# Patient Record
Sex: MALE | NOT HISPANIC OR LATINO | Employment: OTHER | RURAL
[De-identification: names, ages, dates, MRNs, and addresses within clinical notes are randomized per-mention and may not be internally consistent; named-entity substitution may affect disease eponyms.]

---

## 2020-11-28 ENCOUNTER — HISTORICAL (OUTPATIENT)
Dept: ADMINISTRATIVE | Facility: HOSPITAL | Age: 65
End: 2020-11-28

## 2020-11-28 LAB
ALBUMIN SERPL BCP-MCNC: 3.6 G/DL (ref 3.4–5)
ALBUMIN/GLOB SERPL: 1 {RATIO}
ALP SERPL-CCNC: 161 U/L (ref 50–136)
ALT SERPL W P-5'-P-CCNC: 28 U/L (ref 12–78)
ANION GAP SERPL CALCULATED.3IONS-SCNC: 13 MMOL/L
AST SERPL W P-5'-P-CCNC: 49 U/L (ref 15–37)
BACTERIA #/AREA URNS HPF: ABNORMAL /HPF
BASOPHILS # BLD AUTO: 0.01 X10E3/UL (ref 0–0.2)
BASOPHILS NFR BLD AUTO: 0.2 % (ref 0–1)
BILIRUB SERPL-MCNC: 0.5 MG/DL (ref 0.2–1)
BILIRUB UR QL STRIP: NEGATIVE MG/DL
BUN SERPL-MCNC: 16 MG/DL (ref 7–18)
CALCIUM SERPL-MCNC: 9.4 MG/DL (ref 8.5–10.1)
CHLORIDE SERPL-SCNC: 96 MMOL/L (ref 101–111)
CLARITY UR: ABNORMAL
CLARITY UR: ABNORMAL
CO2 SERPL-SCNC: 28 MMOL/L (ref 21–32)
COLOR UR: YELLOW
COLOR UR: YELLOW
CREAT SERPL-MCNC: 0.9 MG/DL (ref 0.6–1.3)
EOSINOPHIL # BLD AUTO: 0 X10E3/UL (ref 0–0.5)
EOSINOPHIL NFR BLD AUTO: 0 % (ref 1–4)
ERYTHROCYTE [DISTWIDTH] IN BLOOD BY AUTOMATED COUNT: 13.7 % (ref 11.5–14.5)
GLOBULIN SER-MCNC: 4.7 G/DL
GLUCOSE SERPL-MCNC: 124 MG/DL (ref 74–106)
GLUCOSE UR STRIP-MCNC: NORMAL MG/DL
HCT VFR BLD AUTO: 43.9 % (ref 40–54)
HGB BLD-MCNC: 15.2 G/DL (ref 13.5–18)
KETONES UR STRIP-SCNC: NEGATIVE MG/DL
LEUKOCYTE ESTERASE UR QL STRIP: NEGATIVE LEU/UL
LIPASE SERPL-CCNC: 99 U/L (ref 73–393)
LYMPHOCYTES # BLD AUTO: 1.41 X10E3/UL (ref 1–4.8)
LYMPHOCYTES NFR BLD AUTO: 28.1 % (ref 27–41)
MAGNESIUM SERPL-MCNC: 1.9 MG/DL (ref 1.8–2.4)
MCH RBC QN AUTO: 33.5 PG (ref 27–31)
MCHC RBC AUTO-ENTMCNC: 34.6 G/DL (ref 32–36)
MCV RBC AUTO: 97 FL (ref 80–96)
MONOCYTES # BLD AUTO: 0.71 X10E3/UL (ref 0–0.8)
MONOCYTES NFR BLD AUTO: 14.2 % (ref 2–6)
MPC BLD CALC-MCNC: 10.2 FL (ref 9.4–12.4)
NEUTROPHILS # BLD AUTO: 2.88 X10E3/UL (ref 1.8–7.7)
NEUTROPHILS NFR BLD AUTO: 57.5 % (ref 53–65)
NITRITE UR QL STRIP: POSITIVE
PH UR STRIP: 6 PH UNITS (ref 5–8)
PLATELET # BLD AUTO: 90 X10E3/UL (ref 150–400)
POTASSIUM SERPL-SCNC: 3.4 MMOL/L (ref 3.6–5)
PROT SERPL-MCNC: 8.3 G/DL (ref 6.4–8.2)
PROT UR QL STRIP: ABNORMAL MG/DL
RBC # BLD AUTO: 4.54 X10E6/UL (ref 4.6–6.2)
RBC # UR STRIP: NEGATIVE ERY/UL
RBC #/AREA URNS HPF: ABNORMAL /HPF (ref 0–3)
SODIUM SERPL-SCNC: 134 MMOL/L (ref 135–145)
SP GR UR STRIP: 1.02 (ref 1–1.03)
SQUAMOUS #/AREA URNS LPF: ABNORMAL /LPF
UROBILINOGEN UR STRIP-ACNC: 0.2 MG/DL
WBC # BLD AUTO: 5.01 X10E3/UL (ref 4.5–11)
WBC #/AREA URNS HPF: ABNORMAL /HPF (ref 0–5)

## 2020-12-01 LAB
REPORT: 38
REPORT: NORMAL

## 2021-08-04 ENCOUNTER — OFFICE VISIT (OUTPATIENT)
Dept: FAMILY MEDICINE | Facility: CLINIC | Age: 66
End: 2021-08-04
Payer: COMMERCIAL

## 2021-08-04 VITALS
RESPIRATION RATE: 16 BRPM | DIASTOLIC BLOOD PRESSURE: 63 MMHG | HEIGHT: 65 IN | TEMPERATURE: 98 F | HEART RATE: 103 BPM | SYSTOLIC BLOOD PRESSURE: 121 MMHG | OXYGEN SATURATION: 95 %

## 2021-08-04 DIAGNOSIS — R26.0 ATAXIC GAIT: ICD-10-CM

## 2021-08-04 DIAGNOSIS — I73.9 PVD (PERIPHERAL VASCULAR DISEASE): ICD-10-CM

## 2021-08-04 DIAGNOSIS — R53.81 DEBILITY: ICD-10-CM

## 2021-08-04 DIAGNOSIS — S88.919A AMPUTATION OF LEG: Primary | ICD-10-CM

## 2021-08-04 PROCEDURE — 99213 PR OFFICE/OUTPT VISIT, EST, LEVL III, 20-29 MIN: ICD-10-PCS | Mod: ,,, | Performed by: FAMILY MEDICINE

## 2021-08-04 PROCEDURE — 99213 OFFICE O/P EST LOW 20 MIN: CPT | Mod: ,,, | Performed by: FAMILY MEDICINE

## 2021-08-04 RX ORDER — CLOPIDOGREL BISULFATE 75 MG/1
75 TABLET ORAL DAILY
COMMUNITY
Start: 2021-03-28 | End: 2022-01-26 | Stop reason: SDUPTHER

## 2021-08-04 RX ORDER — LISINOPRIL 2.5 MG/1
2.5 TABLET ORAL DAILY
COMMUNITY
Start: 2021-03-28 | End: 2022-01-26 | Stop reason: SDUPTHER

## 2021-08-04 RX ORDER — PANTOPRAZOLE SODIUM 40 MG/1
40 TABLET, DELAYED RELEASE ORAL DAILY
COMMUNITY
Start: 2021-03-28 | End: 2022-01-26 | Stop reason: SDUPTHER

## 2021-08-04 RX ORDER — ATORVASTATIN CALCIUM 80 MG/1
80 TABLET, FILM COATED ORAL NIGHTLY
COMMUNITY
Start: 2021-03-28 | End: 2022-01-26 | Stop reason: SDUPTHER

## 2021-12-30 ENCOUNTER — OFFICE VISIT (OUTPATIENT)
Dept: FAMILY MEDICINE | Facility: CLINIC | Age: 66
End: 2021-12-30
Payer: COMMERCIAL

## 2021-12-30 VITALS
HEART RATE: 99 BPM | SYSTOLIC BLOOD PRESSURE: 124 MMHG | TEMPERATURE: 100 F | DIASTOLIC BLOOD PRESSURE: 70 MMHG | OXYGEN SATURATION: 93 % | RESPIRATION RATE: 18 BRPM

## 2021-12-30 DIAGNOSIS — J06.9 UPPER RESPIRATORY TRACT INFECTION, UNSPECIFIED TYPE: Primary | ICD-10-CM

## 2021-12-30 DIAGNOSIS — R05.9 COUGH: ICD-10-CM

## 2021-12-30 DIAGNOSIS — Z20.828 EXPOSURE TO SARS VIRUS: ICD-10-CM

## 2021-12-30 DIAGNOSIS — R09.89 CHEST CONGESTION: ICD-10-CM

## 2021-12-30 LAB
CTP QC/QA: YES
FLUAV AG NPH QL: NEGATIVE
FLUBV AG NPH QL: NEGATIVE
SARS-COV-2 AG RESP QL IA.RAPID: NEGATIVE

## 2021-12-30 PROCEDURE — 3078F PR MOST RECENT DIASTOLIC BLOOD PRESSURE < 80 MM HG: ICD-10-PCS | Mod: ,,, | Performed by: NURSE PRACTITIONER

## 2021-12-30 PROCEDURE — 3074F PR MOST RECENT SYSTOLIC BLOOD PRESSURE < 130 MM HG: ICD-10-PCS | Mod: ,,, | Performed by: NURSE PRACTITIONER

## 2021-12-30 PROCEDURE — 99213 OFFICE O/P EST LOW 20 MIN: CPT | Mod: ,,, | Performed by: NURSE PRACTITIONER

## 2021-12-30 PROCEDURE — 1159F PR MEDICATION LIST DOCUMENTED IN MEDICAL RECORD: ICD-10-PCS | Mod: ,,, | Performed by: NURSE PRACTITIONER

## 2021-12-30 PROCEDURE — 87428 POCT SARS-COV2 (COVID) WITH FLU ANTIGEN: ICD-10-PCS | Mod: QW,,, | Performed by: NURSE PRACTITIONER

## 2021-12-30 PROCEDURE — 1160F RVW MEDS BY RX/DR IN RCRD: CPT | Mod: ,,, | Performed by: NURSE PRACTITIONER

## 2021-12-30 PROCEDURE — 3078F DIAST BP <80 MM HG: CPT | Mod: ,,, | Performed by: NURSE PRACTITIONER

## 2021-12-30 PROCEDURE — 1159F MED LIST DOCD IN RCRD: CPT | Mod: ,,, | Performed by: NURSE PRACTITIONER

## 2021-12-30 PROCEDURE — 3074F SYST BP LT 130 MM HG: CPT | Mod: ,,, | Performed by: NURSE PRACTITIONER

## 2021-12-30 PROCEDURE — 1160F PR REVIEW ALL MEDS BY PRESCRIBER/CLIN PHARMACIST DOCUMENTED: ICD-10-PCS | Mod: ,,, | Performed by: NURSE PRACTITIONER

## 2021-12-30 PROCEDURE — 99213 PR OFFICE/OUTPT VISIT, EST, LEVL III, 20-29 MIN: ICD-10-PCS | Mod: ,,, | Performed by: NURSE PRACTITIONER

## 2021-12-30 PROCEDURE — 87428 SARSCOV & INF VIR A&B AG IA: CPT | Mod: QW,,, | Performed by: NURSE PRACTITIONER

## 2021-12-30 RX ORDER — PREDNISONE 50 MG/1
50 TABLET ORAL DAILY
Qty: 5 TABLET | Refills: 0 | Status: ON HOLD | OUTPATIENT
Start: 2021-12-30 | End: 2022-01-11 | Stop reason: ALTCHOICE

## 2021-12-30 RX ORDER — DOXYCYCLINE 100 MG/1
100 CAPSULE ORAL EVERY 12 HOURS
Qty: 20 CAPSULE | Refills: 0 | Status: SHIPPED | OUTPATIENT
Start: 2021-12-30 | End: 2022-01-04

## 2021-12-30 RX ORDER — PROMETHAZINE HYDROCHLORIDE AND DEXTROMETHORPHAN HYDROBROMIDE 6.25; 15 MG/5ML; MG/5ML
5 SYRUP ORAL EVERY 6 HOURS PRN
Qty: 118 ML | Refills: 0 | Status: SHIPPED | OUTPATIENT
Start: 2021-12-30 | End: 2022-01-09

## 2022-01-04 ENCOUNTER — OFFICE VISIT (OUTPATIENT)
Dept: FAMILY MEDICINE | Facility: CLINIC | Age: 67
End: 2022-01-04
Payer: COMMERCIAL

## 2022-01-04 VITALS
DIASTOLIC BLOOD PRESSURE: 80 MMHG | SYSTOLIC BLOOD PRESSURE: 120 MMHG | RESPIRATION RATE: 18 BRPM | HEART RATE: 110 BPM | OXYGEN SATURATION: 98 %

## 2022-01-04 DIAGNOSIS — J11.1 INFLUENZA: Primary | ICD-10-CM

## 2022-01-04 DIAGNOSIS — R05.9 COUGH: ICD-10-CM

## 2022-01-04 LAB
CTP QC/QA: YES
FLUAV AG NPH QL: POSITIVE
FLUBV AG NPH QL: NEGATIVE
SARS-COV-2 AG RESP QL IA.RAPID: NEGATIVE

## 2022-01-04 PROCEDURE — 3079F PR MOST RECENT DIASTOLIC BLOOD PRESSURE 80-89 MM HG: ICD-10-PCS | Mod: ,,, | Performed by: NURSE PRACTITIONER

## 2022-01-04 PROCEDURE — 87428 POCT SARS-COV2 (COVID) WITH FLU ANTIGEN: ICD-10-PCS | Mod: QW,,, | Performed by: NURSE PRACTITIONER

## 2022-01-04 PROCEDURE — 99213 OFFICE O/P EST LOW 20 MIN: CPT | Mod: ,,, | Performed by: NURSE PRACTITIONER

## 2022-01-04 PROCEDURE — 3074F SYST BP LT 130 MM HG: CPT | Mod: ,,, | Performed by: NURSE PRACTITIONER

## 2022-01-04 PROCEDURE — 87428 SARSCOV & INF VIR A&B AG IA: CPT | Mod: QW,,, | Performed by: NURSE PRACTITIONER

## 2022-01-04 PROCEDURE — 99213 PR OFFICE/OUTPT VISIT, EST, LEVL III, 20-29 MIN: ICD-10-PCS | Mod: ,,, | Performed by: NURSE PRACTITIONER

## 2022-01-04 PROCEDURE — 3074F PR MOST RECENT SYSTOLIC BLOOD PRESSURE < 130 MM HG: ICD-10-PCS | Mod: ,,, | Performed by: NURSE PRACTITIONER

## 2022-01-04 PROCEDURE — 1159F PR MEDICATION LIST DOCUMENTED IN MEDICAL RECORD: ICD-10-PCS | Mod: ,,, | Performed by: NURSE PRACTITIONER

## 2022-01-04 PROCEDURE — 1159F MED LIST DOCD IN RCRD: CPT | Mod: ,,, | Performed by: NURSE PRACTITIONER

## 2022-01-04 PROCEDURE — 3079F DIAST BP 80-89 MM HG: CPT | Mod: ,,, | Performed by: NURSE PRACTITIONER

## 2022-01-04 RX ORDER — OSELTAMIVIR PHOSPHATE 75 MG/1
75 CAPSULE ORAL 2 TIMES DAILY
Qty: 10 CAPSULE | Refills: 0 | Status: SHIPPED | OUTPATIENT
Start: 2022-01-04 | End: 2022-01-09

## 2022-01-04 NOTE — PROGRESS NOTES
STANFORD Ness   [facility]  [address] [address] [address]  [phone]      PATIENT NAME: Luis Manuel Mustafa  : 1953  DATE: 21  MRN: [id number]      Billing Provider: STANFORD Ness  Level of Service:   Patient PCP Information     Provider PCP Type    Farhana Olivier MD General          Reason for Visit / Chief Complaint: Cough and Diarrhea (C/o stomach hurting)       Update PCP  Update Chief Complaint         History of Present Illness / Problem Focused Workflow       Presents with complaints of cough, chest congestion, abdominal pain, diarrhea for several days. He has concerns of covid      Review of Systems     Review of Systems   Constitutional: Positive for fatigue. Negative for chills and fever.   HENT: Positive for congestion. Negative for ear pain and sore throat.    Eyes: Negative for discharge.   Respiratory: Positive for cough. Negative for shortness of breath.         Chest congestion   Cardiovascular: Negative for palpitations.   Gastrointestinal: Positive for abdominal pain, diarrhea and nausea. Negative for vomiting.   Genitourinary: Negative for dysuria.   Musculoskeletal: Negative for gait problem.   Allergic/Immunologic: Negative for environmental allergies.   Neurological: Negative for dizziness, weakness, light-headedness and headaches.   Psychiatric/Behavioral: Negative for dysphoric mood. The patient is not nervous/anxious.        Medical / Social / Family History     Past Medical History:   Diagnosis Date    Below knee amputation     Hypertension     Pacemaker        History reviewed. No pertinent surgical history.    Social History    reports that he has been smoking. His smokeless tobacco use includes snuff. He reports current alcohol use. He reports that he does not use drugs.    Family History  's family history is not on file.    Medications and Allergies     Medications  Outpatient Medications Marked as  Taking for the 12/30/21 encounter (Office Visit) with STANFORD Ness   Medication Sig Dispense Refill    atorvastatin (LIPITOR) 80 MG tablet Take 80 mg by mouth nightly.      clopidogreL (PLAVIX) 75 mg tablet Take 75 mg by mouth once daily.      lisinopriL (PRINIVIL,ZESTRIL) 2.5 MG tablet Take 2.5 mg by mouth once daily.      pantoprazole (PROTONIX) 40 MG tablet Take 40 mg by mouth once daily.         Allergies  Review of patient's allergies indicates:  No Known Allergies    Physical Examination     Vitals:    12/30/21 1014   BP: 124/70   Pulse: 99   Resp: 18   Temp: 99.8 °F (37.7 °C)     Physical Exam  Constitutional:       General: He is not in acute distress.  HENT:      Head: Normocephalic.      Nose: Congestion present. No rhinorrhea.      Mouth/Throat:      Mouth: Mucous membranes are moist.      Pharynx: No posterior oropharyngeal erythema.   Eyes:      Extraocular Movements: Extraocular movements intact.   Cardiovascular:      Rate and Rhythm: Normal rate.      Heart sounds: Normal heart sounds.   Pulmonary:      Effort: Pulmonary effort is normal. No respiratory distress.      Breath sounds: No wheezing.   Abdominal:      General: Bowel sounds are normal.   Musculoskeletal:         General: Normal range of motion.      Cervical back: Neck supple.   Skin:     General: Skin is warm.   Neurological:      Mental Status: He is alert and oriented to person, place, and time.   Psychiatric:         Mood and Affect: Mood normal.           Imaging / Labs       Office Visit on 12/30/2021   Component Date Value Ref Range Status    SARS Coronavirus 2 Antigen 12/30/2021 Negative  Negative Final    Rapid Influenza A Ag 12/30/2021 Negative  Negative Final    Rapid Influenza B Ag 12/30/2021 Negative  Negative Final     Acceptable 12/30/2021 Yes   Final       Assessment and Plan (including Health Maintenance)      Problem List  Smart Sets  Document Outside HM   :    Health Maintenance Due   Topic  Date Due    Hepatitis C Screening  Never done    Lipid Panel  Never done    COVID-19 Vaccine (1) Never done    Pneumococcal Vaccines (Age 65+) (1 of 2 - PPSV23) Never done    TETANUS VACCINE  Never done    Colorectal Cancer Screening  Never done    Shingles Vaccine (1 of 2) Never done    Abdominal Aortic Aneurysm Screening  Never done    Influenza Vaccine (1) Never done       Problem List Items Addressed This Visit        ENT    Upper respiratory tract infection - Primary    Relevant Medications    doxycycline (VIBRAMYCIN) 100 MG Cap    predniSONE (DELTASONE) 50 MG Tab    promethazine-dextromethorphan (PROMETHAZINE-DM) 6.25-15 mg/5 mL Syrp      Other Visit Diagnoses     Exposure to SARS virus        Relevant Orders    POCT SARS-COV2 (COVID) with Flu Antigen (Completed)    Cough        Relevant Medications    predniSONE (DELTASONE) 50 MG Tab    promethazine-dextromethorphan (PROMETHAZINE-DM) 6.25-15 mg/5 mL Syrp    Chest congestion        Relevant Medications    predniSONE (DELTASONE) 50 MG Tab          The patient has no Health Maintenance topics of status Not Due    No future appointments.       Signature:  STANFORD Ness  [address] [address]  [phone]    Date of encounter: 12/30/21

## 2022-01-04 NOTE — PROGRESS NOTES
"   STANFORD Oakley   [facility]  [address]  [place] [address]     PATIENT NAME: Luis Manuel Mustafa  : 1953  DATE: 22  MRN: [id number]      Billing Provider: STANFORD Oakley  Level of Service: MA OFFICE/OUTPT VISIT, EST, LEVL III, 20-29 MIN  Patient PCP Information     Provider PCP Type    Farhana Olivier MD General          Reason for Visit / Chief Complaint: Cough and Nasal Congestion ("just not feeling good")       Update PCP  Update Chief Complaint         History of Present Illness / Problem Focused Workflow     Luis Manuel Mustafa presents to the clinic c/o congestion and cough for several days. No known fever. Has been on doxycycline and prednisone.  Denies SOB      Review of Systems     Review of Systems   Constitutional: Positive for fatigue. Negative for activity change, appetite change, chills and fever.   HENT: Positive for nasal congestion and rhinorrhea. Negative for sore throat and trouble swallowing.    Respiratory: Positive for cough. Negative for shortness of breath.    Cardiovascular: Negative for chest pain.   Gastrointestinal: Negative for abdominal pain, blood in stool, change in bowel habit, nausea, vomiting and change in bowel habit.   Neurological: Negative for dizziness, weakness and headaches.        Medical / Social / Family History     Past Medical History:   Diagnosis Date    Below knee amputation     Hypertension     Pacemaker        No past surgical history on file.    Social History    reports that he has been smoking. His smokeless tobacco use includes snuff. He reports current alcohol use. He reports that he does not use drugs.    Family History  's family history is not on file.    Medications and Allergies     Medications  Outpatient Medications Marked as Taking for the 22 encounter (Office Visit) with STANFORD Gallardo   Medication Sig Dispense Refill    atorvastatin (LIPITOR) 80 MG tablet Take 80 mg by mouth nightly.      " clopidogreL (PLAVIX) 75 mg tablet Take 75 mg by mouth once daily.      lisinopriL (PRINIVIL,ZESTRIL) 2.5 MG tablet Take 2.5 mg by mouth once daily.      pantoprazole (PROTONIX) 40 MG tablet Take 40 mg by mouth once daily.      predniSONE (DELTASONE) 50 MG Tab Take 1 tablet (50 mg total) by mouth once daily. 5 tablet 0       Allergies  Review of patient's allergies indicates:  No Known Allergies    Physical Examination     Vitals:    01/04/22 1500   BP: 120/80   Pulse: 110   Resp: 18   SpO2: 98%      Physical Exam  Constitutional:       Appearance: Normal appearance.   HENT:      Head: Normocephalic.      Right Ear: Hearing and ear canal normal. No tenderness. Tympanic membrane is not injected.      Left Ear: Hearing and ear canal normal. No tenderness. Tympanic membrane is not injected.      Nose: Congestion and rhinorrhea present. No nasal deformity. Rhinorrhea is clear.      Right Turbinates: Not swollen.      Left Turbinates: Not swollen.      Mouth/Throat:      Lips: Pink.      Mouth: Mucous membranes are moist.      Pharynx: No oropharyngeal exudate or posterior oropharyngeal erythema.      Tonsils: No tonsillar exudate.   Eyes:      General: Lids are normal. No allergic shiner.        Right eye: No discharge.         Left eye: No discharge.      Conjunctiva/sclera:      Right eye: Right conjunctiva is not injected.      Left eye: Left conjunctiva is not injected.   Neck:      Trachea: Trachea normal.   Cardiovascular:      Rate and Rhythm: Normal rate and regular rhythm.      Pulses: Normal pulses.      Heart sounds: Normal heart sounds.   Pulmonary:      Effort: Pulmonary effort is normal. No tachypnea, accessory muscle usage or respiratory distress.      Breath sounds: Normal breath sounds.   Abdominal:      Palpations: Abdomen is soft.   Musculoskeletal:         General: Normal range of motion.      Cervical back: Neck supple.   Lymphadenopathy:      Cervical: Cervical adenopathy present.   Skin:      General: Skin is warm and dry.   Neurological:      Mental Status: He is alert.          Assessment and Plan (including Health Maintenance)      Problem List  Smart Sets  Document Outside HM   :      Health Maintenance Due   Topic Date Due    Hepatitis C Screening  Never done    Lipid Panel  Never done    COVID-19 Vaccine (1) Never done    Pneumococcal Vaccines (Age 65+) (1 of 2 - PPSV23) Never done    TETANUS VACCINE  Never done    Colorectal Cancer Screening  Never done    Shingles Vaccine (1 of 2) Never done    Abdominal Aortic Aneurysm Screening  Never done    Influenza Vaccine (1) Never done       Problem List Items Addressed This Visit    None     Visit Diagnoses     Influenza    -  Primary    Will treat influenza with tamiflu and pt to stop doxycycline. Increase fluid intake  Can continue cough medication as directed.    Cough        Relevant Orders    POCT SARS-COV2 (COVID) with Flu Antigen (Completed)        Influenza  Comments:  Will treat influenza with tamiflu and pt to stop doxycycline. Increase fluid intake  Can continue cough medication as directed.    Cough  -     POCT SARS-COV2 (COVID) with Flu Antigen    Other orders  -     oseltamivir (TAMIFLU) 75 MG capsule; Take 1 capsule (75 mg total) by mouth 2 (two) times daily. for 5 days  Dispense: 10 capsule; Refill: 0       The patient has no Health Maintenance topics of status Not Due        Follow up in about 1 week (around 1/11/2022), or if symptoms worsen or fail to improve.     Signature:  STANFORD Oakley    Date of encounter: 1/4/22

## 2022-01-11 ENCOUNTER — HOSPITAL ENCOUNTER (INPATIENT)
Facility: HOSPITAL | Age: 67
LOS: 6 days | Discharge: HOME | DRG: 195 | End: 2022-01-17
Attending: PEDIATRICS | Admitting: PEDIATRICS
Payer: COMMERCIAL

## 2022-01-11 DIAGNOSIS — J18.9 PNEUMONIA OF RIGHT LOWER LOBE DUE TO INFECTIOUS ORGANISM: Primary | ICD-10-CM

## 2022-01-11 DIAGNOSIS — R11.0 NAUSEA: ICD-10-CM

## 2022-01-11 DIAGNOSIS — K59.00 CONSTIPATION: ICD-10-CM

## 2022-01-11 DIAGNOSIS — E87.6 HYPOKALEMIA: ICD-10-CM

## 2022-01-11 DIAGNOSIS — E86.0 DEHYDRATION: ICD-10-CM

## 2022-01-11 DIAGNOSIS — R09.02 HYPOXEMIA: ICD-10-CM

## 2022-01-11 LAB
ALBUMIN SERPL BCP-MCNC: 2 G/DL (ref 3.5–5)
ALBUMIN/GLOB SERPL: 0.4 {RATIO}
ALP SERPL-CCNC: 121 U/L (ref 45–115)
ALT SERPL W P-5'-P-CCNC: 8 U/L (ref 16–61)
ANION GAP SERPL CALCULATED.3IONS-SCNC: 8 MMOL/L (ref 7–16)
AST SERPL W P-5'-P-CCNC: 22 U/L (ref 15–37)
BACTERIA #/AREA URNS HPF: ABNORMAL /HPF
BASOPHILS # BLD AUTO: 0.01 K/UL (ref 0–0.2)
BASOPHILS NFR BLD AUTO: 0.1 % (ref 0–1)
BILIRUB SERPL-MCNC: 0.7 MG/DL (ref 0–1.2)
BILIRUB UR QL STRIP: ABNORMAL
BUN SERPL-MCNC: 7 MG/DL (ref 7–18)
BUN/CREAT SERPL: 8 (ref 6–20)
CALCIUM SERPL-MCNC: 8.7 MG/DL (ref 8.5–10.1)
CHLORIDE SERPL-SCNC: 93 MMOL/L (ref 98–107)
CLARITY UR: ABNORMAL
CO2 SERPL-SCNC: 39 MMOL/L (ref 21–32)
COLOR UR: YELLOW
CREAT SERPL-MCNC: 0.89 MG/DL (ref 0.7–1.3)
DIFFERENTIAL METHOD BLD: ABNORMAL
EOSINOPHIL # BLD AUTO: 0 K/UL (ref 0–0.5)
EOSINOPHIL NFR BLD AUTO: 0 % (ref 1–4)
ERYTHROCYTE [DISTWIDTH] IN BLOOD BY AUTOMATED COUNT: 12.9 % (ref 11.5–14.5)
FLUAV AG UPPER RESP QL IA.RAPID: NEGATIVE
FLUBV AG UPPER RESP QL IA.RAPID: NEGATIVE
GLOBULIN SER-MCNC: 5.2 G/DL (ref 2–4)
GLUCOSE SERPL-MCNC: 104 MG/DL (ref 74–106)
GLUCOSE UR STRIP-MCNC: NEGATIVE MG/DL
HCT VFR BLD AUTO: 33.7 % (ref 40–54)
HGB BLD-MCNC: 11.7 G/DL (ref 13.5–18)
KETONES UR STRIP-SCNC: 15 MG/DL
LEUKOCYTE ESTERASE UR QL STRIP: NEGATIVE
LYMPHOCYTES # BLD AUTO: 0.76 K/UL (ref 1–4.8)
LYMPHOCYTES NFR BLD AUTO: 5.9 % (ref 27–41)
LYMPHOCYTES NFR BLD MANUAL: 11 % (ref 27–41)
MCH RBC QN AUTO: 35.3 PG (ref 27–31)
MCHC RBC AUTO-ENTMCNC: 34.7 G/DL (ref 32–36)
MCV RBC AUTO: 101.8 FL (ref 80–96)
MONOCYTES # BLD AUTO: 0.99 K/UL (ref 0–0.8)
MONOCYTES NFR BLD AUTO: 7.7 % (ref 2–6)
MONOCYTES NFR BLD MANUAL: 3 % (ref 2–6)
MPC BLD CALC-MCNC: 9.6 FL (ref 9.4–12.4)
MUCOUS THREADS #/AREA URNS HPF: ABNORMAL /HPF
NEUTROPHILS # BLD AUTO: 11.14 K/UL (ref 1.8–7.7)
NEUTROPHILS NFR BLD AUTO: 86.3 % (ref 53–65)
NEUTS SEG NFR BLD MANUAL: 86 % (ref 50–62)
NITRITE UR QL STRIP: NEGATIVE
NRBC BLD MANUAL-RTO: ABNORMAL %
PH UR STRIP: 6 PH UNITS
PLATELET # BLD AUTO: 168 K/UL (ref 150–400)
POTASSIUM SERPL-SCNC: 2.3 MMOL/L (ref 3.5–5.1)
PROT SERPL-MCNC: 7.2 G/DL (ref 6.4–8.2)
PROT UR QL STRIP: 30
RBC # BLD AUTO: 3.31 M/UL (ref 4.6–6.2)
RBC # UR STRIP: NEGATIVE /UL
RBC #/AREA URNS HPF: ABNORMAL /HPF
RENAL EPI CELLS #/AREA URNS LPF: ABNORMAL /LPF
SARS-COV+SARS-COV-2 AG RESP QL IA.RAPID: NEGATIVE
SODIUM SERPL-SCNC: 138 MMOL/L (ref 136–145)
SP GR UR STRIP: 1.02
SQUAMOUS #/AREA URNS LPF: ABNORMAL /LPF
UROBILINOGEN UR STRIP-ACNC: 2 MG/DL
WBC # BLD AUTO: 12.9 K/UL (ref 4.5–11)
WBC #/AREA URNS HPF: ABNORMAL /HPF

## 2022-01-11 PROCEDURE — 96365 THER/PROPH/DIAG IV INF INIT: CPT

## 2022-01-11 PROCEDURE — 85025 COMPLETE CBC W/AUTO DIFF WBC: CPT | Performed by: EMERGENCY MEDICINE

## 2022-01-11 PROCEDURE — 99284 EMERGENCY DEPT VISIT MOD MDM: CPT | Performed by: PEDIATRICS

## 2022-01-11 PROCEDURE — 81001 URINALYSIS AUTO W/SCOPE: CPT | Performed by: PEDIATRICS

## 2022-01-11 PROCEDURE — 93010 ELECTROCARDIOGRAM REPORT: CPT | Mod: ,,, | Performed by: INTERNAL MEDICINE

## 2022-01-11 PROCEDURE — 36415 COLL VENOUS BLD VENIPUNCTURE: CPT | Performed by: EMERGENCY MEDICINE

## 2022-01-11 PROCEDURE — 11000001 HC ACUTE MED/SURG PRIVATE ROOM

## 2022-01-11 PROCEDURE — 87428 SARSCOV & INF VIR A&B AG IA: CPT | Performed by: PEDIATRICS

## 2022-01-11 PROCEDURE — 80053 COMPREHEN METABOLIC PANEL: CPT | Performed by: EMERGENCY MEDICINE

## 2022-01-11 PROCEDURE — 99285 EMERGENCY DEPT VISIT HI MDM: CPT | Mod: 25

## 2022-01-11 PROCEDURE — 87040 BLOOD CULTURE FOR BACTERIA: CPT | Performed by: EMERGENCY MEDICINE

## 2022-01-11 PROCEDURE — 63600175 PHARM REV CODE 636 W HCPCS: Performed by: PEDIATRICS

## 2022-01-11 PROCEDURE — 25000003 PHARM REV CODE 250: Performed by: PEDIATRICS

## 2022-01-11 PROCEDURE — 93005 ELECTROCARDIOGRAM TRACING: CPT

## 2022-01-11 PROCEDURE — 93010 EKG 12-LEAD: ICD-10-PCS | Mod: ,,, | Performed by: INTERNAL MEDICINE

## 2022-01-11 RX ORDER — SODIUM CHLORIDE 0.9 % (FLUSH) 0.9 %
10 SYRINGE (ML) INJECTION EVERY 12 HOURS PRN
Status: DISCONTINUED | OUTPATIENT
Start: 2022-01-12 | End: 2022-01-12

## 2022-01-11 RX ORDER — IBUPROFEN 400 MG/1
400 TABLET ORAL EVERY 6 HOURS PRN
Status: DISCONTINUED | OUTPATIENT
Start: 2022-01-12 | End: 2022-01-17 | Stop reason: HOSPADM

## 2022-01-11 RX ORDER — POTASSIUM CHLORIDE 20 MEQ/1
40 TABLET, EXTENDED RELEASE ORAL 3 TIMES DAILY
Status: DISCONTINUED | OUTPATIENT
Start: 2022-01-12 | End: 2022-01-13

## 2022-01-11 RX ORDER — PANTOPRAZOLE SODIUM 40 MG/1
40 TABLET, DELAYED RELEASE ORAL DAILY
Status: DISCONTINUED | OUTPATIENT
Start: 2022-01-12 | End: 2022-01-17 | Stop reason: HOSPADM

## 2022-01-11 RX ORDER — ENOXAPARIN SODIUM 100 MG/ML
40 INJECTION SUBCUTANEOUS EVERY 24 HOURS
Status: DISCONTINUED | OUTPATIENT
Start: 2022-01-12 | End: 2022-01-17 | Stop reason: HOSPADM

## 2022-01-11 RX ORDER — ONDANSETRON 2 MG/ML
4 INJECTION INTRAMUSCULAR; INTRAVENOUS EVERY 8 HOURS PRN
Status: DISCONTINUED | OUTPATIENT
Start: 2022-01-12 | End: 2022-01-17 | Stop reason: HOSPADM

## 2022-01-11 RX ORDER — LISINOPRIL 2.5 MG/1
2.5 TABLET ORAL DAILY
Status: DISCONTINUED | OUTPATIENT
Start: 2022-01-12 | End: 2022-01-17 | Stop reason: HOSPADM

## 2022-01-11 RX ORDER — SODIUM CHLORIDE AND POTASSIUM CHLORIDE 150; 900 MG/100ML; MG/100ML
INJECTION, SOLUTION INTRAVENOUS
Status: COMPLETED | OUTPATIENT
Start: 2022-01-11 | End: 2022-01-11

## 2022-01-11 RX ORDER — SODIUM CHLORIDE 9 MG/ML
INJECTION, SOLUTION INTRAVENOUS
Status: DISCONTINUED | OUTPATIENT
Start: 2022-01-11 | End: 2022-01-11

## 2022-01-11 RX ORDER — ACETAMINOPHEN 325 MG/1
650 TABLET ORAL EVERY 6 HOURS PRN
Status: DISCONTINUED | OUTPATIENT
Start: 2022-01-12 | End: 2022-01-17 | Stop reason: HOSPADM

## 2022-01-11 RX ORDER — POTASSIUM CHLORIDE 20 MEQ/1
40 TABLET, EXTENDED RELEASE ORAL
Status: COMPLETED | OUTPATIENT
Start: 2022-01-11 | End: 2022-01-11

## 2022-01-11 RX ORDER — ATORVASTATIN CALCIUM 40 MG/1
80 TABLET, FILM COATED ORAL NIGHTLY
Status: DISCONTINUED | OUTPATIENT
Start: 2022-01-11 | End: 2022-01-17 | Stop reason: HOSPADM

## 2022-01-11 RX ORDER — SODIUM CHLORIDE AND POTASSIUM CHLORIDE 150; 900 MG/100ML; MG/100ML
INJECTION, SOLUTION INTRAVENOUS CONTINUOUS
Status: DISCONTINUED | OUTPATIENT
Start: 2022-01-12 | End: 2022-01-12

## 2022-01-11 RX ORDER — CLOPIDOGREL BISULFATE 75 MG/1
75 TABLET ORAL DAILY
Status: DISCONTINUED | OUTPATIENT
Start: 2022-01-12 | End: 2022-01-17 | Stop reason: HOSPADM

## 2022-01-11 RX ADMIN — CEFTRIAXONE SODIUM 2 G: 2 INJECTION, POWDER, FOR SOLUTION INTRAMUSCULAR; INTRAVENOUS at 06:01

## 2022-01-11 RX ADMIN — POTASSIUM CHLORIDE 40 MEQ: 1500 TABLET, EXTENDED RELEASE ORAL at 08:01

## 2022-01-11 RX ADMIN — POTASSIUM CHLORIDE AND SODIUM CHLORIDE: 900; 150 INJECTION, SOLUTION INTRAVENOUS at 06:01

## 2022-01-12 PROBLEM — R63.4 WEIGHT LOSS, UNINTENTIONAL: Status: ACTIVE | Noted: 2022-01-12

## 2022-01-12 LAB
ALBUMIN SERPL BCP-MCNC: 1.8 G/DL (ref 3.5–5)
ALBUMIN/GLOB SERPL: 0.4 {RATIO}
ALP SERPL-CCNC: 120 U/L (ref 45–115)
ALT SERPL W P-5'-P-CCNC: 7 U/L (ref 16–61)
AMYLASE SERPL-CCNC: 37 U/L (ref 25–115)
ANION GAP SERPL CALCULATED.3IONS-SCNC: 6 MMOL/L (ref 7–16)
ANION GAP SERPL CALCULATED.3IONS-SCNC: 6 MMOL/L (ref 7–16)
AST SERPL W P-5'-P-CCNC: 19 U/L (ref 15–37)
BASOPHILS # BLD AUTO: 0.01 K/UL (ref 0–0.2)
BASOPHILS NFR BLD AUTO: 0.1 % (ref 0–1)
BILIRUB SERPL-MCNC: 0.7 MG/DL (ref 0–1.2)
BUN SERPL-MCNC: 7 MG/DL (ref 7–18)
BUN SERPL-MCNC: 7 MG/DL (ref 7–18)
BUN/CREAT SERPL: 8 (ref 6–20)
BUN/CREAT SERPL: 8 (ref 6–20)
CALCIUM SERPL-MCNC: 8.3 MG/DL (ref 8.5–10.1)
CALCIUM SERPL-MCNC: 8.4 MG/DL (ref 8.5–10.1)
CHLORIDE SERPL-SCNC: 97 MMOL/L (ref 98–107)
CHLORIDE SERPL-SCNC: 97 MMOL/L (ref 98–107)
CO2 SERPL-SCNC: 37 MMOL/L (ref 21–32)
CO2 SERPL-SCNC: 38 MMOL/L (ref 21–32)
CREAT SERPL-MCNC: 0.86 MG/DL (ref 0.7–1.3)
CREAT SERPL-MCNC: 0.88 MG/DL (ref 0.7–1.3)
DIFFERENTIAL METHOD BLD: ABNORMAL
EOSINOPHIL # BLD AUTO: 0 K/UL (ref 0–0.5)
EOSINOPHIL NFR BLD AUTO: 0 % (ref 1–4)
ERYTHROCYTE [DISTWIDTH] IN BLOOD BY AUTOMATED COUNT: 12.8 % (ref 11.5–14.5)
GLOBULIN SER-MCNC: 4.8 G/DL (ref 2–4)
GLUCOSE SERPL-MCNC: 94 MG/DL (ref 74–106)
GLUCOSE SERPL-MCNC: 94 MG/DL (ref 74–106)
HCT VFR BLD AUTO: 34 % (ref 40–54)
HGB BLD-MCNC: 11.6 G/DL (ref 13.5–18)
LYMPHOCYTES # BLD AUTO: 0.93 K/UL (ref 1–4.8)
LYMPHOCYTES NFR BLD AUTO: 7 % (ref 27–41)
LYMPHOCYTES NFR BLD MANUAL: 11 % (ref 27–41)
MAGNESIUM SERPL-MCNC: 1.7 MG/DL (ref 1.7–2.3)
MCH RBC QN AUTO: 35 PG (ref 27–31)
MCHC RBC AUTO-ENTMCNC: 34.1 G/DL (ref 32–36)
MCV RBC AUTO: 102.7 FL (ref 80–96)
MONOCYTES # BLD AUTO: 1.35 K/UL (ref 0–0.8)
MONOCYTES NFR BLD AUTO: 10.2 % (ref 2–6)
MONOCYTES NFR BLD MANUAL: 3 % (ref 2–6)
MPC BLD CALC-MCNC: 11 FL (ref 9.4–12.4)
NEUTROPHILS # BLD AUTO: 10.94 K/UL (ref 1.8–7.7)
NEUTROPHILS NFR BLD AUTO: 82.7 % (ref 53–65)
NEUTS BAND NFR BLD MANUAL: 3 % (ref 1–5)
NEUTS SEG NFR BLD MANUAL: 83 % (ref 50–62)
NRBC BLD MANUAL-RTO: ABNORMAL %
PHOSPHATE SERPL-MCNC: 3.6 MG/DL (ref 2.5–4.5)
PLATELET # BLD AUTO: 135 K/UL (ref 150–400)
POTASSIUM SERPL-SCNC: 2.4 MMOL/L (ref 3.5–5.1)
POTASSIUM SERPL-SCNC: 2.4 MMOL/L (ref 3.5–5.1)
PROT SERPL-MCNC: 6.6 G/DL (ref 6.4–8.2)
RBC # BLD AUTO: 3.31 M/UL (ref 4.6–6.2)
SODIUM SERPL-SCNC: 138 MMOL/L (ref 136–145)
SODIUM SERPL-SCNC: 139 MMOL/L (ref 136–145)
T3 SERPL-MCNC: 47 NG/DL (ref 60–181)
T4 SERPL-MCNC: 10 ΜG/DL (ref 4.5–12.1)
TSH SERPL DL<=0.005 MIU/L-ACNC: 0.18 UIU/ML (ref 0.36–3.74)
WBC # BLD AUTO: 13.23 K/UL (ref 4.5–11)

## 2022-01-12 PROCEDURE — 84436 ASSAY OF TOTAL THYROXINE: CPT | Performed by: EMERGENCY MEDICINE

## 2022-01-12 PROCEDURE — 36415 COLL VENOUS BLD VENIPUNCTURE: CPT | Performed by: PEDIATRICS

## 2022-01-12 PROCEDURE — 84443 ASSAY THYROID STIM HORMONE: CPT | Performed by: EMERGENCY MEDICINE

## 2022-01-12 PROCEDURE — 11000001 HC ACUTE MED/SURG PRIVATE ROOM

## 2022-01-12 PROCEDURE — 94761 N-INVAS EAR/PLS OXIMETRY MLT: CPT

## 2022-01-12 PROCEDURE — 25000003 PHARM REV CODE 250

## 2022-01-12 PROCEDURE — 92610 EVALUATE SWALLOWING FUNCTION: CPT

## 2022-01-12 PROCEDURE — 25000003 PHARM REV CODE 250: Performed by: PEDIATRICS

## 2022-01-12 PROCEDURE — 85025 COMPLETE CBC W/AUTO DIFF WBC: CPT | Performed by: PEDIATRICS

## 2022-01-12 PROCEDURE — 80053 COMPREHEN METABOLIC PANEL: CPT | Performed by: PEDIATRICS

## 2022-01-12 PROCEDURE — 80048 BASIC METABOLIC PNL TOTAL CA: CPT | Mod: XB | Performed by: EMERGENCY MEDICINE

## 2022-01-12 PROCEDURE — 84100 ASSAY OF PHOSPHORUS: CPT | Performed by: PEDIATRICS

## 2022-01-12 PROCEDURE — 25500020 PHARM REV CODE 255: Performed by: EMERGENCY MEDICINE

## 2022-01-12 PROCEDURE — 25000003 PHARM REV CODE 250: Performed by: EMERGENCY MEDICINE

## 2022-01-12 PROCEDURE — 82150 ASSAY OF AMYLASE: CPT | Performed by: EMERGENCY MEDICINE

## 2022-01-12 PROCEDURE — 83735 ASSAY OF MAGNESIUM: CPT | Performed by: PEDIATRICS

## 2022-01-12 PROCEDURE — 63600175 PHARM REV CODE 636 W HCPCS: Performed by: EMERGENCY MEDICINE

## 2022-01-12 PROCEDURE — 27000221 HC OXYGEN, UP TO 24 HOURS

## 2022-01-12 PROCEDURE — 99232 SBSQ HOSP IP/OBS MODERATE 35: CPT | Mod: ,,, | Performed by: EMERGENCY MEDICINE

## 2022-01-12 PROCEDURE — 84480 ASSAY TRIIODOTHYRONINE (T3): CPT | Performed by: EMERGENCY MEDICINE

## 2022-01-12 PROCEDURE — 99232 PR SUBSEQUENT HOSPITAL CARE,LEVL II: ICD-10-PCS | Mod: ,,, | Performed by: EMERGENCY MEDICINE

## 2022-01-12 PROCEDURE — 63600175 PHARM REV CODE 636 W HCPCS: Performed by: PEDIATRICS

## 2022-01-12 RX ORDER — IPRATROPIUM BROMIDE AND ALBUTEROL SULFATE 2.5; .5 MG/3ML; MG/3ML
3 SOLUTION RESPIRATORY (INHALATION) EVERY 6 HOURS
Status: DISCONTINUED | OUTPATIENT
Start: 2022-01-13 | End: 2022-01-17 | Stop reason: HOSPADM

## 2022-01-12 RX ORDER — SODIUM CHLORIDE AND POTASSIUM CHLORIDE 300; 900 MG/100ML; MG/100ML
INJECTION, SOLUTION INTRAVENOUS CONTINUOUS
Status: DISCONTINUED | OUTPATIENT
Start: 2022-01-12 | End: 2022-01-13

## 2022-01-12 RX ORDER — IBUPROFEN 400 MG/1
TABLET ORAL
Status: COMPLETED
Start: 2022-01-12 | End: 2022-01-12

## 2022-01-12 RX ORDER — AZITHROMYCIN 100 MG/ML
INJECTION, POWDER, LYOPHILIZED, FOR SOLUTION INTRAVENOUS
Status: DISCONTINUED
Start: 2022-01-12 | End: 2022-01-12 | Stop reason: WASHOUT

## 2022-01-12 RX ADMIN — PANTOPRAZOLE SODIUM 40 MG: 40 TABLET, DELAYED RELEASE ORAL at 09:01

## 2022-01-12 RX ADMIN — LISINOPRIL 2.5 MG: 2.5 TABLET ORAL at 09:01

## 2022-01-12 RX ADMIN — IBUPROFEN 400 MG: 400 TABLET ORAL at 12:01

## 2022-01-12 RX ADMIN — POTASSIUM CHLORIDE 40 MEQ: 1500 TABLET, EXTENDED RELEASE ORAL at 02:01

## 2022-01-12 RX ADMIN — CLOPIDOGREL 75 MG: 75 TABLET, FILM COATED ORAL at 09:01

## 2022-01-12 RX ADMIN — ATORVASTATIN CALCIUM 80 MG: 40 TABLET, FILM COATED ORAL at 09:01

## 2022-01-12 RX ADMIN — AZITHROMYCIN MONOHYDRATE 500 MG: 500 INJECTION, POWDER, LYOPHILIZED, FOR SOLUTION INTRAVENOUS at 12:01

## 2022-01-12 RX ADMIN — ATORVASTATIN CALCIUM 80 MG: 40 TABLET, FILM COATED ORAL at 12:01

## 2022-01-12 RX ADMIN — IOPAMIDOL 100 ML: 755 INJECTION, SOLUTION INTRAVENOUS at 07:01

## 2022-01-12 RX ADMIN — ENOXAPARIN SODIUM 40 MG: 40 INJECTION SUBCUTANEOUS at 04:01

## 2022-01-12 RX ADMIN — POTASSIUM CHLORIDE 40 MEQ: 1500 TABLET, EXTENDED RELEASE ORAL at 09:01

## 2022-01-12 RX ADMIN — POTASSIUM CHLORIDE AND SODIUM CHLORIDE: 900; 300 INJECTION, SOLUTION INTRAVENOUS at 08:01

## 2022-01-12 RX ADMIN — MAGNESIUM 64 MG (MAGNESIUM CHLORIDE) TABLET,DELAYED RELEASE 64 MG: at 09:01

## 2022-01-12 RX ADMIN — CEFTRIAXONE SODIUM 2 G: 2 INJECTION, POWDER, FOR SOLUTION INTRAMUSCULAR; INTRAVENOUS at 04:01

## 2022-01-12 NOTE — NURSING
Received pt from ER via wheelchair. Pt transferred himself into the bed. A/Ox4. O2@2L NC on. Oriented to room and call bell. Denies any needs at this time.

## 2022-01-12 NOTE — PLAN OF CARE
Patient is awake and alert, just returned from a CT.  His o2 is 93% on room air as he had his o2 off for a while.  HR 87 and RR 18.  Problem: Respiratory Compromise (Pneumonia)  Goal: Effective Oxygenation and Ventilation  Outcome: Ongoing, Progressing  Intervention: Promote Airway Secretion Clearance  Flowsheets (Taken 1/12/2022 1026)  Cough And Deep Breathing: done independently per patient  Intervention: Optimize Oxygenation and Ventilation  Flowsheets (Taken 1/12/2022 1026)  Airway/Ventilation Management:   airway patency maintained   calming measures promoted  Head of Bed (HOB) Positioning: HOB at 30 degrees

## 2022-01-12 NOTE — ED TRIAGE NOTES
PT ARRIVED BY POV WITH MULTIPLE C/O. HAS FEVER. O2 SATS LOW 84 TO 85% ON RA WITH MULTIPLE ATTEMPTS TO TRY TO GET A BETTER SAT. MOVED TO EXAM 2 AND PLACED ON MONITOR AND 2L/NC. SATS IMPROVED TO 91 TO 92%. DR SAUNDERS MADE AWARE. PT TESTED + FOR FLU A 1 WEEK AGO. STILL HAVING FLU LIKE S/S WITH WORSENING.

## 2022-01-12 NOTE — PLAN OF CARE
[facility] - Medical Surgical Unit  Initial Discharge Assessment       Primary Care Provider: Farhana Olivier MD    Admission Diagnosis: Dehydration [E86.0]  Hypoxemia [R09.02]  Hypokalemia [E87.6]  Nausea [R11.0]  Constipation [K59.00]  Pneumonia of right lower lobe due to infectious organism [J18.9]    Admission Date: 1/11/2022  Expected Discharge Date:     Discharge Barriers Identified: None    Payor: [employer] / Plan: [employer] MEDICARE HMO / Product Type: Medicare Advantage /     Extended Emergency Contact Information  Primary Emergency Contact: ravinder CHOUDHURY  Mobile Phone: [phone]  Relation: Daughter  Preferred language: English   needed? No    Discharge Plan A: Home,Home Health  Discharge Plan B: Other (swingbed)      [facility] - [place], [place] - [address] [place] [address]  Phone: [phone] Fax: [phone]    [facility] - [place] - [address]   [address] [place] [address]  Phone: [phone] Fax: [phone]      Initial Assessment (most recent)     Adult Discharge Assessment - 01/12/22 1421        Discharge Assessment    Assessment Type Discharge Planning Assessment     Source of Information patient     Lives With alone     Do you expect to return to your current living situation? Yes     Do you have help at home or someone to help you manage your care at home? No     Equipment Currently Used at Home wheelchair;other (see comments)   motorized wc    Do you currently have service(s) that help you manage your care at home? No     Are you on dialysis? No     Discharge Plan A Home;Home Health     Discharge Plan B Other   swingbed    DME Needed Upon Discharge  none     Discharge Plan discussed with: Patient     Discharge Barriers Identified None             Pt lives at home alone. Informed ss that their is a man that comes to sit and visit with him every morning. ravinder choudhury, daughter is emerg contact. Family does not live  close to him. Has been getting his own groceries. DME includes wc and motorized wc. No hh pta. Choice obtained for [facility] at dc. Plan dc home with hh or swb if needed. Ss following for dc needs.

## 2022-01-12 NOTE — ASSESSMENT & PLAN NOTE
Patient complains of ongoing nausea and suprapubic abdominal pain and also nausea which is preventing him from eating.  His urinalysis is unremarkable.  May need to continue monitoring intake closely.

## 2022-01-12 NOTE — HPI
68 year old male with history of peripheral vascular disease and hypertension with a pacemaker   in place who presents for a number of complaints including shortness of breath, nausea and   poor appetite, and back pain. For his cough, patient states that he has had a cough for 2   weeks which is worsening. Patient has associated shortness of breath. No fevers. Patient   has no underlying pulmonary problems.     For his nausea, patient states that he has tightness in the abdomen. Patient is having difficulty   swallowing and states he hasn't been able to eat in several weeks. He additionally complains   of a headache in the frontal sinus region now for 2 weeks and chills. He states that he had one   loose stool two days ago.     For his back pain, patient states that he has been sleeping on the couch and attributes his back   pain to this.

## 2022-01-12 NOTE — PROGRESS NOTES
[facility] Medical Surgical Unit  [facility]  Progress Note    Patient Name: Luis Manuel Mustafa  MRN: [id number]  Patient Class: IP- Inpatient   Admission Date: 1/11/2022  Length of Stay: 1 days  Attending Physician: Danis Jackson MD  Primary Care Provider: Farhana Olivier MD        Subjective:     Principal Problem:Pneumonia of right lower lobe due to infectious organism        HPI:  68 year old male with history of peripheral vascular disease and hypertension with a pacemaker   in place who presents for a number of complaints including shortness of breath, nausea and   poor appetite, and back pain. For his cough, patient states that he has had a cough for 2   weeks which is worsening. Patient has associated shortness of breath. No fevers. Patient   has no underlying pulmonary problems.     For his nausea, patient states that he has tightness in the abdomen. Patient is having difficulty   swallowing and states he hasn't been able to eat in several weeks. He additionally complains   of a headache in the frontal sinus region now for 2 weeks and chills. He states that he had one   loose stool two days ago.     For his back pain, patient states that he has been sleeping on the couch and attributes his back   pain to this.       Overview/Hospital Course:  1/12  He seems much better this AM. Has taken off O2. Sleeping well.             He has cc/o sever weight loss. He claims he has no dysphagia which contradicts his history with Dr Hernandez.       Interval History: See HPI    Review of Systems   HENT: Negative for congestion.    Respiratory: Positive for cough and shortness of breath. Negative for chest tightness, wheezing and stridor.    Cardiovascular: Negative for chest pain.   Gastrointestinal: Positive for abdominal pain (chronic ). Negative for abdominal distention.   Genitourinary: Negative for difficulty urinating.   Musculoskeletal: Positive for back pain. Negative for arthralgias.   Neurological: Negative for  dizziness.   All other systems reviewed and are negative.    Objective:     Vital Signs (Most Recent):  Temp: 97.5 °F (36.4 °C) (01/12/22 0340)  Pulse: 83 (01/12/22 0340)  Resp: 19 (01/12/22 0340)  BP: 99/64 (01/12/22 0340)  SpO2: 98 % (01/12/22 0340) Vital Signs (24h Range):  Temp:  [97.5 °F (36.4 °C)-101.3 °F (38.5 °C)] 97.5 °F (36.4 °C)  Pulse:  [] 83  Resp:  [15-25] 19  SpO2:  [85 %-98 %] 98 %  BP: ()/(50-73) 99/64     Weight: 49.4 kg (109 lb)  Body mass index is 18.14 kg/m².    Intake/Output Summary (Last 24 hours) at 1/12/2022 0642  Last data filed at 1/12/2022 0600  Gross per 24 hour   Intake 490 ml   Output --   Net 490 ml      Physical Exam  Vitals reviewed.   HENT:      Head: Normocephalic.      Nose: Nose normal.      Mouth/Throat:      Mouth: Mucous membranes are moist.   Eyes:      Pupils: Pupils are equal, round, and reactive to light.   Cardiovascular:      Rate and Rhythm: Normal rate.   Pulmonary:      Effort: Pulmonary effort is normal.   Musculoskeletal:         General: Normal range of motion.      Comments: LBKA  Stump healed   Skin:     General: Skin is warm and dry.      Capillary Refill: Capillary refill takes less than 2 seconds.   Neurological:      General: No focal deficit present.      Mental Status: He is alert.   Psychiatric:         Mood and Affect: Mood normal.         Significant Labs:   All pertinent labs within the past 24 hours have been reviewed.  BMP:   Recent Labs   Lab 01/12/22  0542   GLU 94      K 2.4*   CL 97*   CO2 38*   BUN 7   CREATININE 0.88   CALCIUM 8.3*   MG 1.7     CBC:   Recent Labs   Lab 01/11/22  1745 01/12/22  0542   WBC 12.90* 13.23*   HGB 11.7* 11.6*   HCT 33.7* 34.0*    135*       Significant Imaging: I have reviewed all pertinent imaging results/findings within the past 24 hours.  CXR: I have reviewed all pertinent results/findings within the past 24 hours and my personal findings are:  RLL infiltrate      Assessment/Plan:      *  Pneumonia of right lower lobe due to infectious organism  68 year old male who presents with cough and hypoxia with CXR demonstrating a right lower lobe pneumonia.  COVID testing is negative.    1.  Rocephin 2 gram IV daily   2.  Tylenol and motrin as needed for fevers    Day #2 IV Rocephin and Zithromax      Nausea  Patient complains of ongoing nausea and suprapubic abdominal pain and also nausea which is preventing him from eating.  His urinalysis is unremarkable.  May need to continue monitoring intake closely.       Hypokalemia  Patient has low potassium but at this time, appears fairly asymptomatic from this.    1.  Potassium chloride 40 mEq three times daily  2.  Recheck BMP in the AM  3.  Fluids also have potassium     1/12 K+ is 2.4 this AM.  Will increase IV K+ and start oral magnesium      Dehydration    Patient additionally has not been eating and drinking as well now for several weeks which is concerning.  We will need to closely monitor his oral intake.   1.  Restart home PPI  2.  Encourage oral intake  3.  May need a speech eval for his swallow      VTE Risk Mitigation (From admission, onward)         Ordered     enoxaparin injection 40 mg  Daily         01/11/22 7794                Discharge Planning   CHANCE:      Code Status: Full Code   Is the patient medically ready for discharge?:     Reason for patient still in hospital (select all that apply): Treatment                     Danis Jackson MD  Department of Hospital Medicine   [place] - Medical Surgical Unit

## 2022-01-12 NOTE — ASSESSMENT & PLAN NOTE
68 year old male who presents with cough and hypoxia with CXR demonstrating a right lower lobe pneumonia.  COVID testing is negative.    1.  Rocephin 2 gram IV daily   2.  Tylenol and motrin as needed for fevers

## 2022-01-12 NOTE — HOSPITAL COURSE
1/12  He seems much better this AM. Has taken off O2. Sleeping well.             He has cc/o sever weight loss. He claims he has no dysphagia which contradicts his history with Dr Hernandez.     1/13  Day # IV Zithromax/Rocephin. He is swallowing today. Eating today. K+ better    01/14/22  Currently receiving IV Rocephin and Azithromycin for treatment of pneumonia. Receiving duo nebs every 6 hours. O2 sat 98% on O2 at 2L/NC. Has coarse breath sounds noted in RLL only. Reports that he is eating and drinking better. BUN/CR 3/0.7, K+ 4.2. Patient denies any new complaints today, no problems reported by nursing staff today.    01/15/22: Patient on day 4 of 5 for IV Rocephin and Azithromycin. Continues to eat and drink well. Has O2 off at present. Reports feeling better. Lungs clear to auscultation. O2 sat 98% this am. BUN/CR 3/0.81, K+ 4.2. Preliminary BC from 01/11 no growth. Denies any new complaints today, no problems reported by staff.     01/16/22: Day 5 of 5 of IV Rocephin and Azithromycin for treatment of pneumonia. Patient sitting up in bed, reports feeling much better. O2 is off and sat is 97% on room air. WBC 4.28, K+ 4.3, BUN/CR 2/0.74. Patient denies any complaints today.    01/17/2021  He has reached [facility] .will be discharged home. Off O2.

## 2022-01-12 NOTE — ASSESSMENT & PLAN NOTE
68 year old male who presents with cough and hypoxia with CXR demonstrating a right lower lobe pneumonia.  COVID testing is negative.    1.  Rocephin 2 gram IV daily   2.  Tylenol and motrin as needed for fevers    Day #2 IV Rocephin and Zithromax

## 2022-01-12 NOTE — ASSESSMENT & PLAN NOTE
Patient has low potassium but at this time, appears fairly asymptomatic from this.    1.  Potassium chloride 40 mEq three times daily  2.  Recheck BMP in the AM  3.  Fluids also have potassium     1/12 K+ is 2.4 this AM.  Will increase IV K+ and start oral magnesium

## 2022-01-12 NOTE — PLAN OF CARE
Problem: Adult Inpatient Plan of Care  Goal: Plan of Care Review  Outcome: Ongoing, Progressing  Goal: Patient-Specific Goal (Individualized)  Outcome: Ongoing, Progressing  Goal: Absence of Hospital-Acquired Illness or Injury  Outcome: Ongoing, Progressing  Goal: Optimal Comfort and Wellbeing  Outcome: Ongoing, Progressing  Goal: Readiness for Transition of Care  Outcome: Ongoing, Progressing     Problem: Fluid Imbalance (Pneumonia)  Goal: Fluid Balance  Outcome: Ongoing, Progressing     Problem: Infection (Pneumonia)  Goal: Resolution of Infection Signs and Symptoms  Outcome: Ongoing, Progressing     Problem: Respiratory Compromise (Pneumonia)  Goal: Effective Oxygenation and Ventilation  Outcome: Ongoing, Progressing     Problem: Skin Injury Risk Increased  Goal: Skin Health and Integrity  Outcome: Ongoing, Progressing

## 2022-01-12 NOTE — H&P
[facility] Medicine  History & Physical    Patient Name: Luis Manuel Mustafa  MRN: [id number]  Patient Class: IP- Inpatient  Admission Date: 1/11/2022  Attending Physician: Danis Jackson MD   Primary Care Provider: Farhana Olivier MD         Patient information was obtained from patient and ER records.     Subjective:     Principal Problem:Pneumonia of right lower lobe due to infectious organism    Chief Complaint:   Chief Complaint   Patient presents with    Abdominal Pain    Nausea    Headache    Influenza    Cough    Fever    Anorexia           Generalized Body Aches    Weakness        HPI: 68 year old male with history of peripheral vascular disease and hypertension with a pacemaker   in place who presents for a number of complaints including shortness of breath, nausea and   poor appetite, and back pain. For his cough, patient states that he has had a cough for 2   weeks which is worsening. Patient has associated shortness of breath. No fevers. Patient   has no underlying pulmonary problems.     For his nausea, patient states that he has tightness in the abdomen. Patient is having difficulty   swallowing and states he hasn't been able to eat in several weeks. He additionally complains   of a headache in the frontal sinus region now for 2 weeks and chills. He states that he had one   loose stool two days ago.     For his back pain, patient states that he has been sleeping on the couch and attributes his back   pain to this.       Past Medical History:   Diagnosis Date    Below knee amputation     Hypertension     Pacemaker        Past Surgical History:   Procedure Laterality Date    BKA Left     CARDIAC SURGERY      PACEMAKER, IVCF       Review of patient's allergies indicates:  No Known Allergies    No current facility-administered medications on file prior to encounter.     Current Outpatient Medications on File Prior to Encounter   Medication Sig    atorvastatin (LIPITOR)  80 MG tablet Take 80 mg by mouth nightly.    clopidogreL (PLAVIX) 75 mg tablet Take 75 mg by mouth once daily.    lisinopriL (PRINIVIL,ZESTRIL) 2.5 MG tablet Take 2.5 mg by mouth once daily.    pantoprazole (PROTONIX) 40 MG tablet Take 40 mg by mouth once daily.    [DISCONTINUED] predniSONE (DELTASONE) 50 MG Tab Take 1 tablet (50 mg total) by mouth once daily.     Family History    None       Tobacco Use    Smoking status: Current Every Day Smoker    Smokeless tobacco: Current User     Types: Snuff   Substance and Sexual Activity    Alcohol use: Yes     Comment: 3 bottles/week    Drug use: Never    Sexual activity: Not on file     Review of Systems   Constitutional: Positive for appetite change and chills.   HENT: Negative.    Eyes: Negative.    Respiratory: Positive for cough and shortness of breath.    Cardiovascular: Negative.    Gastrointestinal: Positive for diarrhea and nausea.   Genitourinary: Positive for dysuria.   Musculoskeletal: Positive for back pain.   Skin: Negative.      Objective:     Vital Signs (Most Recent):  Temp: 100.2 °F (37.9 °C) (01/11/22 1650)  Pulse: 107 (01/11/22 1900)  Resp: (!) 21 (01/11/22 1900)  BP: 124/65 (01/11/22 1900)  SpO2: 97 % (01/11/22 1900) Vital Signs (24h Range):  Temp:  [100.2 °F (37.9 °C)] 100.2 °F (37.9 °C)  Pulse:  [103-119] 107  Resp:  [17-25] 21  SpO2:  [85 %-97 %] 97 %  BP: (119-140)/(65-73) 124/65     Weight: 49.4 kg (109 lb)  Body mass index is 18.14 kg/m².    Physical Exam  Constitutional:       Comments: Appeared very thin and frail   HENT:      Head: Normocephalic and atraumatic.      Right Ear: Tympanic membrane normal.      Left Ear: Tympanic membrane normal.      Nose: Nose normal.      Mouth/Throat:      Mouth: Mucous membranes are dry.   Eyes:      Extraocular Movements: Extraocular movements intact.      Pupils: Pupils are equal, round, and reactive to light.   Cardiovascular:      Rate and Rhythm: Normal rate and regular rhythm.      Pulses:  Normal pulses.   Pulmonary:      Comments: Diffuse crackles with decreased aeration at the right lower lung field   Abdominal:      General: Abdomen is flat.      Palpations: Abdomen is soft.      Comments: Some mild tenderness in the suprapubic region   Musculoskeletal:      Cervical back: Normal range of motion.      Comments: Left leg below the knee amputation   Neurological:      General: No focal deficit present.      Mental Status: He is alert and oriented to person, place, and time.   Psychiatric:         Mood and Affect: Mood normal.           CRANIAL NERVES     CN III, IV, VI   Pupils are equal, round, and reactive to light.       Significant Labs: All pertinent labs within the past 24 hours have been reviewed.    Significant Imaging: I have reviewed all pertinent imaging results/findings within the past 24 hours.    Assessment/Plan:     * Pneumonia of right lower lobe due to infectious organism  68 year old male who presents with cough and hypoxia with CXR demonstrating a right lower lobe pneumonia.  COVID testing is negative.    1.  Rocephin 2 gram IV daily   2.  Tylenol and motrin as needed for fevers      Nausea  Patient complains of ongoing nausea and suprapubic abdominal pain and also nausea which is preventing him from eating.  His urinalysis is unremarkable.  May need to continue monitoring intake closely.       Hypokalemia  Patient has low potassium but at this time, appears fairly asymptomatic from this.    1.  Potassium chloride 40 mEq three times daily  2.  Recheck BMP in the AM  3.  Fluids also have potassium       Dehydration    Patient additionally has not been eating and drinking as well now for several weeks which is concerning.  We will need to closely monitor his oral intake.   1.  Restart home PPI  2.  Encourage oral intake  3.  May need a speech eval for his swallow      VTE Risk Mitigation (From admission, onward)    Coby Hernandez MD  Department of Hospital Medicine    [facility]

## 2022-01-12 NOTE — SUBJECTIVE & OBJECTIVE
Interval History: See HPI    Review of Systems   HENT: Negative for congestion.    Respiratory: Positive for cough and shortness of breath. Negative for chest tightness, wheezing and stridor.    Cardiovascular: Negative for chest pain.   Gastrointestinal: Positive for abdominal pain (chronic ). Negative for abdominal distention.   Genitourinary: Negative for difficulty urinating.   Musculoskeletal: Positive for back pain. Negative for arthralgias.   Neurological: Negative for dizziness.   All other systems reviewed and are negative.    Objective:     Vital Signs (Most Recent):  Temp: 97.5 °F (36.4 °C) (01/12/22 0340)  Pulse: 83 (01/12/22 0340)  Resp: 19 (01/12/22 0340)  BP: 99/64 (01/12/22 0340)  SpO2: 98 % (01/12/22 0340) Vital Signs (24h Range):  Temp:  [97.5 °F (36.4 °C)-101.3 °F (38.5 °C)] 97.5 °F (36.4 °C)  Pulse:  [] 83  Resp:  [15-25] 19  SpO2:  [85 %-98 %] 98 %  BP: ()/(50-73) 99/64     Weight: 49.4 kg (109 lb)  Body mass index is 18.14 kg/m².    Intake/Output Summary (Last 24 hours) at 1/12/2022 0642  Last data filed at 1/12/2022 0600  Gross per 24 hour   Intake 490 ml   Output --   Net 490 ml      Physical Exam  Vitals reviewed.   HENT:      Head: Normocephalic.      Nose: Nose normal.      Mouth/Throat:      Mouth: Mucous membranes are moist.   Eyes:      Pupils: Pupils are equal, round, and reactive to light.   Cardiovascular:      Rate and Rhythm: Normal rate.   Pulmonary:      Effort: Pulmonary effort is normal.   Musculoskeletal:         General: Normal range of motion.      Comments: LBKA  Stump healed   Skin:     General: Skin is warm and dry.      Capillary Refill: Capillary refill takes less than 2 seconds.   Neurological:      General: No focal deficit present.      Mental Status: He is alert.   Psychiatric:         Mood and Affect: Mood normal.         Significant Labs:   All pertinent labs within the past 24 hours have been reviewed.  BMP:   Recent Labs   Lab 01/12/22  0542   GLU 94       K 2.4*   CL 97*   CO2 38*   BUN 7   CREATININE 0.88   CALCIUM 8.3*   MG 1.7     CBC:   Recent Labs   Lab 01/11/22  1745 01/12/22  0542   WBC 12.90* 13.23*   HGB 11.7* 11.6*   HCT 33.7* 34.0*    135*       Significant Imaging: I have reviewed all pertinent imaging results/findings within the past 24 hours.  CXR: I have reviewed all pertinent results/findings within the past 24 hours and my personal findings are:  RLL infiltrate

## 2022-01-12 NOTE — SUBJECTIVE & OBJECTIVE
Past Medical History:   Diagnosis Date    Below knee amputation     Hypertension     Pacemaker        Past Surgical History:   Procedure Laterality Date    BKA Left     CARDIAC SURGERY      PACEMAKER, IVCF       Review of patient's allergies indicates:  No Known Allergies    No current facility-administered medications on file prior to encounter.     Current Outpatient Medications on File Prior to Encounter   Medication Sig    atorvastatin (LIPITOR) 80 MG tablet Take 80 mg by mouth nightly.    clopidogreL (PLAVIX) 75 mg tablet Take 75 mg by mouth once daily.    lisinopriL (PRINIVIL,ZESTRIL) 2.5 MG tablet Take 2.5 mg by mouth once daily.    pantoprazole (PROTONIX) 40 MG tablet Take 40 mg by mouth once daily.    [DISCONTINUED] predniSONE (DELTASONE) 50 MG Tab Take 1 tablet (50 mg total) by mouth once daily.     Family History    None       Tobacco Use    Smoking status: Current Every Day Smoker    Smokeless tobacco: Current User     Types: Snuff   Substance and Sexual Activity    Alcohol use: Yes     Comment: 3 bottles/week    Drug use: Never    Sexual activity: Not on file     Review of Systems   Constitutional: Positive for appetite change and chills.   HENT: Negative.    Eyes: Negative.    Respiratory: Positive for cough and shortness of breath.    Cardiovascular: Negative.    Gastrointestinal: Positive for diarrhea and nausea.   Genitourinary: Positive for dysuria.   Musculoskeletal: Positive for back pain.   Skin: Negative.      Objective:     Vital Signs (Most Recent):  Temp: 100.2 °F (37.9 °C) (01/11/22 1650)  Pulse: 107 (01/11/22 1900)  Resp: (!) 21 (01/11/22 1900)  BP: 124/65 (01/11/22 1900)  SpO2: 97 % (01/11/22 1900) Vital Signs (24h Range):  Temp:  [100.2 °F (37.9 °C)] 100.2 °F (37.9 °C)  Pulse:  [103-119] 107  Resp:  [17-25] 21  SpO2:  [85 %-97 %] 97 %  BP: (119-140)/(65-73) 124/65     Weight: 49.4 kg (109 lb)  Body mass index is 18.14 kg/m².    Physical Exam  Constitutional:        Comments: Appeared very thin and frail   HENT:      Head: Normocephalic and atraumatic.      Right Ear: Tympanic membrane normal.      Left Ear: Tympanic membrane normal.      Nose: Nose normal.      Mouth/Throat:      Mouth: Mucous membranes are dry.   Eyes:      Extraocular Movements: Extraocular movements intact.      Pupils: Pupils are equal, round, and reactive to light.   Cardiovascular:      Rate and Rhythm: Normal rate and regular rhythm.      Pulses: Normal pulses.   Pulmonary:      Comments: Diffuse crackles with decreased aeration at the right lower lung field   Abdominal:      General: Abdomen is flat.      Palpations: Abdomen is soft.      Comments: Some mild tenderness in the suprapubic region   Musculoskeletal:      Cervical back: Normal range of motion.      Comments: Left leg below the knee amputation   Neurological:      General: No focal deficit present.      Mental Status: He is alert and oriented to person, place, and time.   Psychiatric:         Mood and Affect: Mood normal.           CRANIAL NERVES     CN III, IV, VI   Pupils are equal, round, and reactive to light.       Significant Labs: All pertinent labs within the past 24 hours have been reviewed.    Significant Imaging: I have reviewed all pertinent imaging results/findings within the past 24 hours.

## 2022-01-12 NOTE — ASSESSMENT & PLAN NOTE
Patient has low potassium but at this time, appears fairly asymptomatic from this.    1.  Potassium chloride 40 mEq three times daily  2.  Recheck BMP in the AM  3.  Fluids also have potassium

## 2022-01-12 NOTE — PT/OT/SLP EVAL
Speech Language Pathology Evaluation  Bedside Swallow    Patient Name:  Luis Manuel Mustafa   MRN:  [id number]  Admitting Diagnosis: Pneumonia of right lower lobe due to infectious organism    Recommendations:                 General Recommendations:  GI evaluation and Dysphagia therapy  Diet recommendations:   , Full liquids   Aspiration Precautions: 1 bite/sip at a time, Alternating bites/sips, Double swallow with each bite/sip, Small bites/sips and Strict aspiration precautions   General Precautions: Standard, aspiration,fall  Communication strategies:  none    History:     Past Medical History:   Diagnosis Date    Below knee amputation     High cholesterol     Hypertension     Pacemaker        Past Surgical History:   Procedure Laterality Date    BACK SURGERY      BKA Left     CARDIAC SURGERY      PACEMAKER, IVCF       Social History: Patient lives alone with daughter checking in on patient. Pt with ability to provide self care/cook meals per patient, overall decline over several months with steady weight loss since L BKA in Feb 2020 per patient. Pt with minimal intake of breakfast per patient, consumes soft meats, garden vegetables 1-2x/day when not nauseous.    Prior Intubation HX:  N/A     Modified Barium Swallow: Recommend completion of MBS and upper GI consult secondary to pt with coughing/choking during PO intake, nasal drip with PO intake, lower lung pneumonia, and complaint of weight loss with extreme nausea    Chest X-Rays:   Narrative & Impression  EXAMINATION:  XR CHEST AP PORTABLE     CLINICAL HISTORY:  hypoxia;     TECHNIQUE:  Single frontal view of the chest was performed.     COMPARISON:  11/28/2020     FINDINGS:  Cardiac device intact.  The heart size is normal.  Left lung is clear.  There is a density in the right lung base.  Right upper lung is clear.  No pneumothorax.     Impression:     Right basilar density may reflect pneumonia.  Atelectasis and pleural fluid would look  similar.        Electronically signed by: Malvin Barrios  Date:                                            01/11/2022  Time:                                           17:53    Prior diet: soft solids, thin liquids per patient with decreased intake.      Subjective     Pt alert to ST entrance, agreeable to eval. Pt with denial of swallowing difficulty until ST increased explanation of various symptoms  Patient goals: go home        Objective:     Oral Musculature Evaluation  · Oral Musculature: general weakness  · Dentition: edentulous (dentures at home but pt states they do not fit secondary to increased weight loss over past months)  · Secretion Management: adequate  · Mucosal Quality: adequate  · Mandibular Strength and Mobility: impaired  · Oral Labial Strength and Mobility: WNL  · Lingual Strength and Mobility: WNL  · Velar Elevation: WNL  · Buccal Strength and Mobility: WNL  · Volitional Cough: WFL  · Volitional Swallow: Mod difficulty with swallow initiation with 2-3 tries before successful swallow. Decreased elevation and anterior movement of laryngeal structure  · Voice Prior to PO Intake: WFL    Bedside Swallow Eval:   Consistencies Assessed:  · Thin liquids Trials x2 independently, moderate coughing noted. Edu for compensatory strategy small sip, upright, slow intake, tolerated with min throat clear x3 trials.   · Puree trial x2 with throat clear x1,  nasal drip noted.  · Soft solids Trial x1 with pt adequate mastication, globus sensation per pt report, sniffing noted     Oral Phase:   · WNL    Pharyngeal Phase:   · decreased hyolaryngeal excursion to palpation  · delayed swallow initation  · globus sensation  · throat clearing    Compensatory Strategies  · Chin tuck  · Effortful swallow  · Multiple swallows    Treatment: 5x/week for strengthening with recommendation of full liquid diet and MBS/upper GI.    Assessment:     Luis Manuel Mustafa is a 68 y.o. male with an SLP diagnosis of moderate to severe Dysphagia  characterized by lower lobe pneumonia. Pt with difficulty in eval for swallow initiation with decreased hyolaryngeal excursion, moderate weight loss per patient with nausea with all intake and globus sensation at times. ST recommend full liquid diet at this time for least restrictive PO intake until further assessment via MBS, upper GI/abdominal exam.    Goals:   Multidisciplinary Problems     SLP Goals        Problem: SLP Goal    Goal Priority Disciplines Outcome   SLP Goal     SLP Ongoing, Progressing   Description: Patient will tolerate NMES without overt signs/symptoms of aspiration.   Patient will complete hard glottal attacks and tongue base retractions exercises with mod-max accuracy Independently.   Patient will complete laryngeal elevation exercises with mod-max accuracy Independently.   Patient will tolerate least restrictive diet with no overt s/sx aspiration.                        Plan:     · Patient to be seen:  5 x/week   · Plan of Care expires:  02/09/22  · Plan of Care reviewed with:  patient   · SLP Follow-Up:  Yes       Discharge recommendations:  home health speech therapy   Barriers to Discharge:  Level of Skilled Assistance Needed currently    Time Tracking:       Billable Minutes: Eval Swallow and Oral Function 25    01/12/2022

## 2022-01-12 NOTE — ASSESSMENT & PLAN NOTE
Patient additionally has not been eating and drinking as well now for several weeks which is concerning.  We will need to closely monitor his oral intake.   1.  Restart home PPI  2.  Encourage oral intake  3.  May need a speech eval for his swallow

## 2022-01-12 NOTE — ED PROVIDER NOTES
Encounter Date: 1/11/2022       History     Chief Complaint   Patient presents with    Abdominal Pain    Nausea    Headache    Influenza    Cough    Fever    Anorexia           Generalized Body Aches    Weakness     68 year old male with history of peripheral vascular disease and hypertension with a pacemaker in place who presents for a number of complaints including shortness of breath, nausea and poor appetite, and back pain.  For his cough, patient states that he has had a cough for 2 weeks which is worsening.  Patient has associated shortness of breath.  No fevers.  Patient has no underlying pulmonary problems.      For his nausea, patient states that he has tightness in the abdomen.  Patient is having difficulty swallowing and states he hasn't been able to eat in several weeks.  He additionally complains of a headache in the frontal sinus region now for 2 weeks and chills.  He states that he had one loose stool two days ago.      For his back pain, patient states that he has been sleeping on the couch and attributes his back pain to this.          Review of patient's allergies indicates:  No Known Allergies  Past Medical History:   Diagnosis Date    Below knee amputation     Hypertension     Pacemaker      Past Surgical History:   Procedure Laterality Date    BKA Left     CARDIAC SURGERY      PACEMAKER, IVCF     History reviewed. No pertinent family history.  Social History     Tobacco Use    Smoking status: Current Every Day Smoker    Smokeless tobacco: Current User     Types: Snuff   Substance Use Topics    Alcohol use: Yes     Comment: 3 bottles/week    Drug use: Never     Review of Systems   Constitutional: Positive for chills and fever.   HENT: Positive for congestion.    Eyes: Negative.    Respiratory: Positive for cough and shortness of breath.    Cardiovascular: Negative.    Gastrointestinal: Positive for nausea. Negative for vomiting.   Genitourinary: Positive for dysuria. Negative  for hematuria.   Musculoskeletal: Positive for back pain.   Neurological: Negative.        Physical Exam     Initial Vitals [01/11/22 1650]   BP Pulse Resp Temp SpO2   127/70 (!) 119 17 100.2 °F (37.9 °C) (!) 85 %      MAP       --         Physical Exam    Vitals reviewed.  Constitutional: He appears well-developed and well-nourished.   HENT:   Head: Normocephalic.   Nasal cannula in place   Eyes: EOM are normal. Pupils are equal, round, and reactive to light.   Neck: Neck supple.   Normal range of motion.  Cardiovascular: Regular rhythm and normal heart sounds.   Pulmonary/Chest:   Diffuse bilateral crackles, decreased aeration at the right lower lung field    Abdominal: Abdomen is soft. There is abdominal tenderness.   Slight tenderness to palpation in the suprapubic region    Musculoskeletal:         General: Normal range of motion.      Cervical back: Normal range of motion and neck supple.     Neurological: He is alert and oriented to person, place, and time.   Skin: Skin is warm.   Psychiatric: He has a normal mood and affect.         Medical Screening Exam   See Full Note    ED Course   Procedures  Labs Reviewed   COMPREHENSIVE METABOLIC PANEL - Abnormal; Notable for the following components:       Result Value    Potassium 2.3 (*)     Chloride 93 (*)     CO2 39 (*)     Albumin 2.0 (*)     Globulin 5.2 (*)     Alk Phos 121 (*)     ALT 8 (*)     All other components within normal limits   CBC WITH DIFFERENTIAL - Abnormal; Notable for the following components:    WBC 12.90 (*)     RBC 3.31 (*)     Hemoglobin 11.7 (*)     Hematocrit 33.7 (*)     .8 (*)     MCH 35.3 (*)     Neutrophils % 86.3 (*)     Lymphocytes % 5.9 (*)     Neutrophils, Abs 11.14 (*)     Lymphocytes, Absolute 0.76 (*)     Monocytes % 7.7 (*)     Eosinophils % 0.0 (*)     Monocytes, Absolute 0.99 (*)     All other components within normal limits   MANUAL DIFFERENTIAL - Abnormal; Notable for the following components:    Segmented  Neutrophils, Man % 86 (*)     Lymphocytes, Man % 11 (*)     All other components within normal limits   SARS-COV2 (COVID) W/ FLU ANTIGEN - Normal    Narrative:     Negative SARS-CoV results should not be used as the sole basis for treatment or patient management decisions; negative results should be considered in the context of a patient's recent exposures, history and the presene of clinical signs and symptoms consistent with COVID-19.  Negative results should be treated as presumptive and confirmed by molecular assay, if necessary for patient management.   CULTURE, BLOOD   CULTURE, BLOOD   CBC W/ AUTO DIFFERENTIAL    Narrative:     The following orders were created for panel order CBC Auto Differential.  Procedure                               Abnormality         Status                     ---------                               -----------         ------                     CBC with Differential[[id number]]        Abnormal            Final result               Manual Differential[[id number]]          Abnormal            Final result                 Please view results for these tests on the individual orders.   URINALYSIS, REFLEX TO URINE CULTURE        ECG Results          EKG 12-lead (In process)  Result time 01/11/22 17:46:36    In process by Interface, Lab In [facility] (01/11/22 17:46:36)                 Narrative:    Test Reason : R09.02,    Vent. Rate : 108 BPM     Atrial Rate : 108 BPM     P-R Int : 226 ms          QRS Dur : 100 ms      QT Int : 272 ms       P-R-T Axes : 093 083 094 degrees     QTc Int : 364 ms    Sinus tachycardia with 1st degree A-V block  Biatrial enlargement  Anterior infarct ,age undetermined  Abnormal ECG  No previous ECGs available    Referred By: [email or web address]   SELF           Confirmed By:                             Imaging Results          X-Ray Abdomen AP 1 View (KUB) (In process)                X-Ray Chest AP Portable (Final result)  Result time 01/11/22 17:53:08    Final result by  Malvin Barrios MD (01/11/22 17:53:08)                 Impression:      Right basilar density may reflect pneumonia.  Atelectasis and pleural fluid would look similar.      Electronically signed by: Malvin Barrios  Date:    01/11/2022  Time:    17:53             Narrative:    EXAMINATION:  XR CHEST AP PORTABLE    CLINICAL HISTORY:  hypoxia;    TECHNIQUE:  Single frontal view of the chest was performed.    COMPARISON:  11/28/2020    FINDINGS:  Cardiac device intact.  The heart size is normal.  Left lung is clear.  There is a density in the right lung base.  Right upper lung is clear.  No pneumothorax.                              X-Rays:   Independently Interpreted Readings:   Other Readings:  Xray demonstrates a right lower lung infiltrate most consistent with pneumonia    Medications   potassium chloride SA CR tablet 40 mEq (has no administration in time range)   cefTRIAXone (ROCEPHIN) 2 g in dextrose 5 % in water (D5W) 5 % 50 mL IVPB (MB+) (2 g Intravenous New Bag 1/11/22 1815)   0.9 % NaCl with KCl 20 mEq infusion ( Intravenous New Bag 1/11/22 1855)     Medical Decision Making:   Initial Assessment:   68 year old male with peripheral vascular disease who presents with cough, hypoxia and shortness of breath.  He is additionally having poor oral intake and significant nausea with some dysuria and suprapubic tenderness.    Differential Diagnosis:   Patient has a right lower lobe pneumonia versus pneumonia with effusion.  COVID 19 could be playing a role in his illness given his headaches.  Patient additionally has abdominal pain and poor appetite which could be due to an obstruction versus urinary tract infection versus constipation versus other egiology.    ED Management:  Patient has pneumonia and hypoxia and has been given Rocephin in the ER and has been placed on oxygen.  He is also dehydrated and hypokalemiac and has been given fluids and started on fluids with potassium and will be given in the ER.  We were unable to  get a sample of urine in the ER but would like to see if this is contributing to his issues with poor appetite or just him being ill with the pneumonia.  We will admit him to the hospital and manage him from there.                     Clinical Impression:   Final diagnoses:  [R09.02] Hypoxemia  [K59.00] Constipation  [J18.9] Pneumonia of right lower lobe due to infectious organism (Primary)  [E86.0] Dehydration  [E87.6] Hypokalemia  [R11.0] Nausea          ED Disposition Condition    Admit               Stef Hernandez MD  01/11/22 1946

## 2022-01-12 NOTE — PLAN OF CARE
Problem: SLP Goal  Goal: SLP Goal  Description: Patient will tolerate NMES without overt signs/symptoms of aspiration.   Patient will complete hard glottal attacks and tongue base retractions exercises with mod-max accuracy Independently.   Patient will complete laryngeal elevation exercises with mod-max accuracy Independently.   Patient will tolerate least restrictive diet with no overt s/sx aspiration.       Outcome: Ongoing, Progressing

## 2022-01-13 LAB
ANION GAP SERPL CALCULATED.3IONS-SCNC: 11 MMOL/L (ref 7–16)
BASOPHILS # BLD AUTO: 0.01 K/UL (ref 0–0.2)
BASOPHILS NFR BLD AUTO: 0.1 % (ref 0–1)
BUN SERPL-MCNC: 4 MG/DL (ref 7–18)
BUN/CREAT SERPL: 5 (ref 6–20)
CALCIUM SERPL-MCNC: 7.7 MG/DL (ref 8.5–10.1)
CHLORIDE SERPL-SCNC: 99 MMOL/L (ref 98–107)
CO2 SERPL-SCNC: 28 MMOL/L (ref 21–32)
CREAT SERPL-MCNC: 0.77 MG/DL (ref 0.7–1.3)
DIFFERENTIAL METHOD BLD: ABNORMAL
EOSINOPHIL # BLD AUTO: 0.01 K/UL (ref 0–0.5)
EOSINOPHIL NFR BLD AUTO: 0.1 % (ref 1–4)
ERYTHROCYTE [DISTWIDTH] IN BLOOD BY AUTOMATED COUNT: 12.8 % (ref 11.5–14.5)
GLUCOSE SERPL-MCNC: 101 MG/DL (ref 74–106)
HCT VFR BLD AUTO: 26.6 % (ref 40–54)
HGB BLD-MCNC: 8.8 G/DL (ref 13.5–18)
LYMPHOCYTES # BLD AUTO: 1.01 K/UL (ref 1–4.8)
LYMPHOCYTES NFR BLD AUTO: 10.4 % (ref 27–41)
LYMPHOCYTES NFR BLD MANUAL: 12 % (ref 27–41)
MCH RBC QN AUTO: 34.5 PG (ref 27–31)
MCHC RBC AUTO-ENTMCNC: 33.1 G/DL (ref 32–36)
MCV RBC AUTO: 104.3 FL (ref 80–96)
MONOCYTES # BLD AUTO: 0.89 K/UL (ref 0–0.8)
MONOCYTES NFR BLD AUTO: 9.2 % (ref 2–6)
MONOCYTES NFR BLD MANUAL: 7 % (ref 2–6)
MPC BLD CALC-MCNC: 10.6 FL (ref 9.4–12.4)
NEUTROPHILS # BLD AUTO: 7.75 K/UL (ref 1.8–7.7)
NEUTROPHILS NFR BLD AUTO: 80.2 % (ref 53–65)
NEUTS SEG NFR BLD MANUAL: 81 % (ref 50–62)
NRBC BLD MANUAL-RTO: ABNORMAL %
PLATELET # BLD AUTO: 148 K/UL (ref 150–400)
POTASSIUM SERPL-SCNC: 3.4 MMOL/L (ref 3.5–5.1)
RBC # BLD AUTO: 2.55 M/UL (ref 4.6–6.2)
SODIUM SERPL-SCNC: 135 MMOL/L (ref 136–145)
WBC # BLD AUTO: 9.67 K/UL (ref 4.5–11)

## 2022-01-13 PROCEDURE — 92526 ORAL FUNCTION THERAPY: CPT

## 2022-01-13 PROCEDURE — 25500020 PHARM REV CODE 255: Performed by: EMERGENCY MEDICINE

## 2022-01-13 PROCEDURE — 36415 COLL VENOUS BLD VENIPUNCTURE: CPT | Performed by: EMERGENCY MEDICINE

## 2022-01-13 PROCEDURE — 80048 BASIC METABOLIC PNL TOTAL CA: CPT | Performed by: EMERGENCY MEDICINE

## 2022-01-13 PROCEDURE — 94761 N-INVAS EAR/PLS OXIMETRY MLT: CPT

## 2022-01-13 PROCEDURE — 25000003 PHARM REV CODE 250: Performed by: PEDIATRICS

## 2022-01-13 PROCEDURE — 99232 PR SUBSEQUENT HOSPITAL CARE,LEVL II: ICD-10-PCS | Mod: ,,, | Performed by: EMERGENCY MEDICINE

## 2022-01-13 PROCEDURE — 99232 SBSQ HOSP IP/OBS MODERATE 35: CPT | Mod: ,,, | Performed by: EMERGENCY MEDICINE

## 2022-01-13 PROCEDURE — 25000003 PHARM REV CODE 250: Performed by: EMERGENCY MEDICINE

## 2022-01-13 PROCEDURE — 85025 COMPLETE CBC W/AUTO DIFF WBC: CPT | Performed by: EMERGENCY MEDICINE

## 2022-01-13 PROCEDURE — 11000001 HC ACUTE MED/SURG PRIVATE ROOM

## 2022-01-13 PROCEDURE — 25000242 PHARM REV CODE 250 ALT 637 W/ HCPCS

## 2022-01-13 PROCEDURE — 27000221 HC OXYGEN, UP TO 24 HOURS

## 2022-01-13 PROCEDURE — 63600175 PHARM REV CODE 636 W HCPCS: Performed by: PEDIATRICS

## 2022-01-13 PROCEDURE — 94640 AIRWAY INHALATION TREATMENT: CPT

## 2022-01-13 PROCEDURE — 63600175 PHARM REV CODE 636 W HCPCS: Performed by: EMERGENCY MEDICINE

## 2022-01-13 RX ORDER — POTASSIUM CHLORIDE 20 MEQ/1
20 TABLET, EXTENDED RELEASE ORAL 2 TIMES DAILY
Status: DISCONTINUED | OUTPATIENT
Start: 2022-01-13 | End: 2022-01-17 | Stop reason: HOSPADM

## 2022-01-13 RX ORDER — SODIUM CHLORIDE 9 MG/ML
INJECTION, SOLUTION INTRAVENOUS DAILY PRN
Status: DISCONTINUED | OUTPATIENT
Start: 2022-01-13 | End: 2022-01-14

## 2022-01-13 RX ADMIN — SODIUM CHLORIDE: 9 INJECTION, SOLUTION INTRAVENOUS at 04:01

## 2022-01-13 RX ADMIN — MAGNESIUM 64 MG (MAGNESIUM CHLORIDE) TABLET,DELAYED RELEASE 64 MG: at 09:01

## 2022-01-13 RX ADMIN — PANTOPRAZOLE SODIUM 40 MG: 40 TABLET, DELAYED RELEASE ORAL at 09:01

## 2022-01-13 RX ADMIN — IBUPROFEN 400 MG: 400 TABLET ORAL at 01:01

## 2022-01-13 RX ADMIN — POTASSIUM CHLORIDE AND SODIUM CHLORIDE 75 ML/HR: 900; 300 INJECTION, SOLUTION INTRAVENOUS at 01:01

## 2022-01-13 RX ADMIN — IPRATROPIUM BROMIDE AND ALBUTEROL SULFATE 3 ML: 2.5; .5 SOLUTION RESPIRATORY (INHALATION) at 02:01

## 2022-01-13 RX ADMIN — IPRATROPIUM BROMIDE AND ALBUTEROL SULFATE 3 ML: 2.5; .5 SOLUTION RESPIRATORY (INHALATION) at 07:01

## 2022-01-13 RX ADMIN — CEFTRIAXONE SODIUM 2 G: 2 INJECTION, POWDER, FOR SOLUTION INTRAMUSCULAR; INTRAVENOUS at 04:01

## 2022-01-13 RX ADMIN — ENOXAPARIN SODIUM 40 MG: 40 INJECTION SUBCUTANEOUS at 05:01

## 2022-01-13 RX ADMIN — CLOPIDOGREL 75 MG: 75 TABLET, FILM COATED ORAL at 09:01

## 2022-01-13 RX ADMIN — ATORVASTATIN CALCIUM 80 MG: 40 TABLET, FILM COATED ORAL at 08:01

## 2022-01-13 RX ADMIN — IPRATROPIUM BROMIDE AND ALBUTEROL SULFATE 3 ML: 2.5; .5 SOLUTION RESPIRATORY (INHALATION) at 01:01

## 2022-01-13 RX ADMIN — POTASSIUM CHLORIDE 40 MEQ: 1500 TABLET, EXTENDED RELEASE ORAL at 09:01

## 2022-01-13 RX ADMIN — IOPAMIDOL 100 ML: 755 INJECTION, SOLUTION INTRAVENOUS at 04:01

## 2022-01-13 RX ADMIN — AZITHROMYCIN MONOHYDRATE 500 MG: 500 INJECTION, POWDER, LYOPHILIZED, FOR SOLUTION INTRAVENOUS at 01:01

## 2022-01-13 RX ADMIN — IBUPROFEN 400 MG: 400 TABLET ORAL at 08:01

## 2022-01-13 RX ADMIN — POTASSIUM CHLORIDE 20 MEQ: 1500 TABLET, EXTENDED RELEASE ORAL at 08:01

## 2022-01-13 NOTE — PT/OT/SLP PROGRESS
"Speech Language Pathology Treatment    Patient Name:  Luis Manuel Mustafa   MRN:  [id number]  Admitting Diagnosis: Pneumonia of right lower lobe due to infectious organism    Recommendations:                 General Recommendations:  Dysphagia therapy  Diet recommendations:   ,Upgrade to MCS ground meats, thin liquids  Aspiration Precautions: 1 bite/sip at a time, Alternating bites/sips, Double swallow with each bite/sip and Monitor for s/s of aspiration   General Precautions: Standard, aspiration,fall  Communication strategies:  none    Subjective     Pt alert  To ST entrance and requesting food.  Patient goals: feel better     Respiratory Status: Room air    Objective:     Has the patient been evaluated by SLP for swallowing?   Yes  Keep patient NPO? No    Pt with [facility] ground meats trial tray with ST present for observation. Nurse indicated medication intake of whole pills with water with no difficulty.   Pt with mildly increased mastication due to endentulous, adequate swallow initiation x4, with alternation of solids and liquids adequately independently. ST noted cough at trial 5 of solid intake. Pt with slightly larger bites and clearance of cough, continued intake. ST noted cough again at trial 11 with solid intake.     ST edu regarding slow rate, double swallow with all intake. Pt demonstrated with continued intake, verbal reminders at times and no overt s/sx aspiration.     Post meal, pt indicated to ST "dara feels like sticks some but lower down the burn is what gets me."    ST questioned regarding history of having esophagus stretched with mod explanation of procedure and purpose, pt indicated a prior physician recommended it but he never completed. Pt did indicate minimal nausea this date.         Assessment:     Luis Manuel Mustafa is a 68 y.o. male with an SLP diagnosis of Dysphagia.  He presents with difficulty swallowing at times and new onset of pneumonia with substantial weight loss over prior months. Pt with " increased ability to tolerate PO this date with compensatory strategies.  ST recommendation: MCS, ground meats, thin liquids.Double swallow, slow rate.    Goals:   Multidisciplinary Problems     SLP Goals        Problem: SLP Goal    Goal Priority Disciplines Outcome   SLP Goal     SLP Ongoing, Progressing   Description: Patient will tolerate NMES without overt signs/symptoms of aspiration.   Patient will complete hard glottal attacks and tongue base retractions exercises with mod-max accuracy Independently.   Patient will complete laryngeal elevation exercises with mod-max accuracy Independently.   Patient will tolerate least restrictive diet with no overt s/sx aspiration.                        Plan:     · Patient to be seen:  5 x/week   · Plan of Care expires:  02/09/22  · Plan of Care reviewed with:  patient   · SLP Follow-Up:  Yes       Discharge recommendations:  home health speech therapy   Barriers to Discharge:  Level of Skilled Assistance Needed currently    Time Tracking:     SLP Treatment Date:   01/13/22  Speech Start Time:     Speech Stop Time:        Speech Total Time (min):       Billable Minutes: Treatment Swallowing Dysfunction 35    01/13/2022

## 2022-01-13 NOTE — ASSESSMENT & PLAN NOTE
Patient complains of ongoing nausea and suprapubic abdominal pain and also nausea which is preventing him from eating.  His urinalysis is unremarkable.  May need to continue monitoring intake closely.     1/13 resolved

## 2022-01-13 NOTE — ASSESSMENT & PLAN NOTE
1/13 CT abdomen and also CT Chest for suspected Lung Mass and weight loss essentially negative for Cancer diagnosis    Will as for GI referral at discharge

## 2022-01-13 NOTE — PLAN OF CARE
Problem: Adult Inpatient Plan of Care  Goal: Plan of Care Review  Outcome: Ongoing, Progressing     Problem: Adult Inpatient Plan of Care  Goal: Patient-Specific Goal (Individualized)  Outcome: Ongoing, Progressing     Problem: Adult Inpatient Plan of Care  Goal: Absence of Hospital-Acquired Illness or Injury  Outcome: Ongoing, Progressing     Problem: Adult Inpatient Plan of Care  Goal: Optimal Comfort and Wellbeing  Outcome: Ongoing, Progressing     Problem: Respiratory Compromise (Pneumonia)  Goal: Effective Oxygenation and Ventilation  Outcome: Ongoing, Progressing     Plan of care reviewed.

## 2022-01-13 NOTE — PLAN OF CARE
Care plan reviewed  Problem: Infection (Pneumonia)  Goal: Resolution of Infection Signs and Symptoms  Outcome: Ongoing, Progressing     Problem: Respiratory Compromise (Pneumonia)  Goal: Effective Oxygenation and Ventilation  Outcome: Ongoing, Progressing     Problem: Fluid Imbalance (Pneumonia)  Goal: Fluid Balance  Outcome: Ongoing, Progressing     Problem: Adult Inpatient Plan of Care  Goal: Optimal Comfort and Wellbeing  Outcome: Ongoing, Progressing

## 2022-01-13 NOTE — ASSESSMENT & PLAN NOTE
Patient has low potassium but at this time, appears fairly asymptomatic from this.    1.  Potassium chloride 40 mEq three times daily  2.  Recheck BMP in the AM  3.  Fluids also have potassium     1/12 K+ is 2.4 this AM.  Will increase IV K+ and start oral magnesium  1/13 K+ 3.4 adjusting K+ therapy

## 2022-01-13 NOTE — ASSESSMENT & PLAN NOTE
68 year old male who presents with cough and hypoxia with CXR demonstrating a right lower lobe pneumonia.  COVID testing is negative.    1.  Rocephin 2 gram IV daily   2.  Tylenol and motrin as needed for fevers    Day #2 IV Rocephin and Zithromax    1/13  CT Chest shows infiltrate. No Tumor identified. Needs F/U CT in a few weeks

## 2022-01-13 NOTE — SUBJECTIVE & OBJECTIVE
Interval History: see hospital course . Doing better    Review of Systems   Constitutional: Positive for appetite change (much better).   Respiratory: Negative for shortness of breath (improved) and wheezing.    All other systems reviewed and are negative.    Objective:     Vital Signs (Most Recent):  Temp: 97.7 °F (36.5 °C) (01/13/22 0733)  Pulse: 79 (01/13/22 0736)  Resp: 18 (01/13/22 0736)  BP: (!) 101/56 (01/13/22 0733)  SpO2: 100 % (01/13/22 0736) Vital Signs (24h Range):  Temp:  [96.6 °F (35.9 °C)-101.7 °F (38.7 °C)] 97.7 °F (36.5 °C)  Pulse:  [] 79  Resp:  [14-20] 18  SpO2:  [86 %-100 %] 100 %  BP: ()/(52-58) 101/56     Weight: 49.4 kg (109 lb)  Body mass index is 18.14 kg/m².    Intake/Output Summary (Last 24 hours) at 1/13/2022 1013  Last data filed at 1/13/2022 0500  Gross per 24 hour   Intake 980 ml   Output --   Net 980 ml      Physical Exam  Vitals reviewed.   Constitutional:       Appearance: Normal appearance.   HENT:      Head: Normocephalic.      Nose: Nose normal.      Mouth/Throat:      Mouth: Mucous membranes are moist.   Eyes:      Pupils: Pupils are equal, round, and reactive to light.   Cardiovascular:      Rate and Rhythm: Normal rate.      Pulses: Normal pulses.   Pulmonary:      Effort: No respiratory distress.      Breath sounds: Wheezing (minimal) and rhonchi present.   Abdominal:      General: Abdomen is flat.   Musculoskeletal:         General: Normal range of motion.      Comments: Right bka   Skin:     General: Skin is warm.      Capillary Refill: Capillary refill takes less than 2 seconds.   Neurological:      General: No focal deficit present.      Mental Status: He is alert.   Psychiatric:         Mood and Affect: Mood normal.         Thought Content: Thought content normal.         Judgment: Judgment normal.         Significant Labs:   All pertinent labs within the past 24 hours have been reviewed.  BMP:   Recent Labs   Lab 01/12/22  0542 01/12/22  0542 01/13/22  0430    GLU 94  94   < > 101     139   < > 135*   K 2.4*  2.4*   < > 3.4*   CL 97*  97*   < > 99   CO2 37*  38*   < > 28   BUN 7  7   < > 4*   CREATININE 0.86  0.88   < > 0.77   CALCIUM 8.4*  8.3*   < > 7.7*   MG 1.7  --   --     < > = values in this interval not displayed.     CBC:   Recent Labs   Lab 01/11/22  1745 01/12/22  0542 01/13/22  0430   WBC 12.90* 13.23* 9.67   HGB 11.7* 11.6* 8.8*   HCT 33.7* 34.0* 26.6*    135* 148*       Significant Imaging: I have reviewed all pertinent imaging results/findings within the past 24 hours.

## 2022-01-13 NOTE — CARE UPDATE
Initial Assessment:  Pts o2 sat is 100% on 1 liter nasal cannula, heart rate is 79 BPM, respiratory rate is 18 BPM, and breath sounds are slightly coarse. Pt is getting duoneb treatments every 6 hours.       CXR: Right basilar density may reflect pneumonia. Atelectasis and pleural fluid would look similar.           (1/13/22) 04:30   (1/13/22) 1 d ago   (1/12/22) 1 d ago   (1/12/22) 2 d ago   (1/11/22) 2 d ago   (1/11/22) 1 yr ago   (11/28/20)     WBC 4.50 - 11.00 K/uL 9.67    13.23 High    12.90 High   5.01 R    RBC 4.60 - 6.20 M/uL 2.55 Low     3.31 Low    3.31 Low   4.54 Low  R    Hemoglobin 13.5 - 18.0 g/dL 8.8 Low     11.6 Low    11.7 Low   15.2    Hematocrit 40.0 - 54.0 % 26.6 Low     34.0 Low    33.7 Low   43.9

## 2022-01-13 NOTE — PROGRESS NOTES
[facility]  [facility] Medicine  Progress Note    Patient Name: Luis Manuel Mustafa  MRN: [id number]  Patient Class: IP- Inpatient   Admission Date: 1/11/2022  Length of Stay: 2 days  Attending Physician: Danis Jackson MD  Primary Care Provider: Farhana Olivier MD        Subjective:     Principal Problem:Pneumonia of right lower lobe due to infectious organism        HPI:  68 year old male with history of peripheral vascular disease and hypertension with a pacemaker   in place who presents for a number of complaints including shortness of breath, nausea and   poor appetite, and back pain. For his cough, patient states that he has had a cough for 2   weeks which is worsening. Patient has associated shortness of breath. No fevers. Patient   has no underlying pulmonary problems.     For his nausea, patient states that he has tightness in the abdomen. Patient is having difficulty   swallowing and states he hasn't been able to eat in several weeks. He additionally complains   of a headache in the frontal sinus region now for 2 weeks and chills. He states that he had one   loose stool two days ago.     For his back pain, patient states that he has been sleeping on the couch and attributes his back   pain to this.       Overview/Hospital Course:  1/12  He seems much better this AM. Has taken off O2. Sleeping well.             He has cc/o sever weight loss. He claims he has no dysphagia which contradicts his history with Dr Hernandez.     1/13  Day # IV Zithromax/Rocephin. He is swallowing today. Eating today. K+ better      Interval History: see hospital course . Doing better    Review of Systems   Constitutional: Positive for appetite change (much better).   Respiratory: Negative for shortness of breath (improved) and wheezing.    All other systems reviewed and are negative.    Objective:     Vital Signs (Most Recent):  Temp: 97.7 °F (36.5 °C) (01/13/22 0733)  Pulse: 79 (01/13/22 0736)  Resp: 18 (01/13/22  0736)  BP: (!) 101/56 (01/13/22 0733)  SpO2: 100 % (01/13/22 0736) Vital Signs (24h Range):  Temp:  [96.6 °F (35.9 °C)-101.7 °F (38.7 °C)] 97.7 °F (36.5 °C)  Pulse:  [] 79  Resp:  [14-20] 18  SpO2:  [86 %-100 %] 100 %  BP: ()/(52-58) 101/56     Weight: 49.4 kg (109 lb)  Body mass index is 18.14 kg/m².    Intake/Output Summary (Last 24 hours) at 1/13/2022 1013  Last data filed at 1/13/2022 0500  Gross per 24 hour   Intake 980 ml   Output --   Net 980 ml      Physical Exam  Vitals reviewed.   Constitutional:       Appearance: Normal appearance.   HENT:      Head: Normocephalic.      Nose: Nose normal.      Mouth/Throat:      Mouth: Mucous membranes are moist.   Eyes:      Pupils: Pupils are equal, round, and reactive to light.   Cardiovascular:      Rate and Rhythm: Normal rate.      Pulses: Normal pulses.   Pulmonary:      Effort: No respiratory distress.      Breath sounds: Wheezing (minimal) and rhonchi present.   Abdominal:      General: Abdomen is flat.   Musculoskeletal:         General: Normal range of motion.      Comments: Right bka   Skin:     General: Skin is warm.      Capillary Refill: Capillary refill takes less than 2 seconds.   Neurological:      General: No focal deficit present.      Mental Status: He is alert.   Psychiatric:         Mood and Affect: Mood normal.         Thought Content: Thought content normal.         Judgment: Judgment normal.         Significant Labs:   All pertinent labs within the past 24 hours have been reviewed.  BMP:   Recent Labs   Lab 01/12/22  0542 01/12/22  0542 01/13/22  0430   GLU 94  94   < > 101     139   < > 135*   K 2.4*  2.4*   < > 3.4*   CL 97*  97*   < > 99   CO2 37*  38*   < > 28   BUN 7  7   < > 4*   CREATININE 0.86  0.88   < > 0.77   CALCIUM 8.4*  8.3*   < > 7.7*   MG 1.7  --   --     < > = values in this interval not displayed.     CBC:   Recent Labs   Lab 01/11/22  1745 01/12/22  0542 01/13/22  0430   WBC 12.90* 13.23* 9.67   HGB  11.7* 11.6* 8.8*   HCT 33.7* 34.0* 26.6*    135* 148*       Significant Imaging: I have reviewed all pertinent imaging results/findings within the past 24 hours.      Assessment/Plan:      * Pneumonia of right lower lobe due to infectious organism  68 year old male who presents with cough and hypoxia with CXR demonstrating a right lower lobe pneumonia.  COVID testing is negative.    1.  Rocephin 2 gram IV daily   2.  Tylenol and motrin as needed for fevers    Day #2 IV Rocephin and Zithromax    1/13  CT Chest shows infiltrate. No Tumor identified. Needs F/U CT in a few weeks      Weight loss, unintentional  1/13 CT abdomen and also CT Chest for suspected Lung Mass and weight loss essentially negative for Cancer diagnosis    Will as for GI referral at discharge      Nausea  Patient complains of ongoing nausea and suprapubic abdominal pain and also nausea which is preventing him from eating.  His urinalysis is unremarkable.  May need to continue monitoring intake closely.     1/13 resolved    Hypokalemia  Patient has low potassium but at this time, appears fairly asymptomatic from this.    1.  Potassium chloride 40 mEq three times daily  2.  Recheck BMP in the AM  3.  Fluids also have potassium     1/12 K+ is 2.4 this AM.  Will increase IV K+ and start oral magnesium  1/13 K+ 3.4 adjusting K+ therapy    Dehydration    Patient additionally has not been eating and drinking as well now for several weeks which is concerning.  We will need to closely monitor his oral intake.   1.  Restart home PPI  2.  Encourage oral intake  3.  May need a speech eval for his swallow    1/13 eating /swallowing concerns much improved. He is taking diet and fluids and snacks.      VTE Risk Mitigation (From admission, onward)         Ordered     enoxaparin injection 40 mg  Daily         01/11/22 9236                Discharge Planning   CHANCE:      Code Status: Full Code   Is the patient medically ready for discharge?:     Reason for  patient still in hospital (select all that apply): Treatment  Discharge Plan A: Home,Home Health                  Danis Jackson MD  Department of Hospital Medicine   [facility] - Medical Surgical Unit

## 2022-01-13 NOTE — CONSULTS
[facility]  Adult Nutrition  Consult Note    SUMMARY     Recommendations    Recommendation/Intervention: 1. Ensure Clear po BID 2. Defer diet adv to ST 3. Multivitamin one po daily 4. may want to consider Reglan before meals if nausea persists.  Goals: Meet estimated needs >75%  Nutrition Goal Status: new  Communication of RD Recs: reviewed with physician    Assessment and Plan    No new Assessment & Plan notes have been filed under this hospital service since the last note was generated.  Service: Nutrition       Malnutrition Assessment  Malnutrition Type: chronic illness  Energy Intake: moderate energy intake          Weight Loss (Malnutrition): 20% in 1 year  Subcutaneous Fat (Malnutrition): moderate depletion  Muscle Mass (Malnutrition): moderate depletion   Orbital Region (Subcutaneous Fat Loss): moderate depletion  Upper Arm Region (Subcutaneous Fat Loss): moderate depletion   Rastafarian Region (Muscle Loss): moderate depletion  Clavicle Bone Region (Muscle Loss): moderate depletion  Clavicle and Acromion Bone Region (Muscle Loss): moderate depletion       Subcutaneous Fat Loss (Final Summary): moderate protein-calorie malnutrition  Muscle Loss Evaluation (Final Summary): moderate protein-calorie malnutrition    Moderate Weight Loss (Malnutrition): 20% in 1 year    Reason for Assessment    Reason For Assessment: consult  Diagnosis:  (pneu, dehydration, involuntary WL)  Relevant Medical History: WL, pacemaker, PVD with L BKA, HTN, Tobacco abuse, HLD, GERD  Interdisciplinary Rounds:  (Will attend SB meeting)  General Information Comments: RDN visited pt today and he iis much better per staff and wantin to eat solids.  RDN noted ST eval and discussed case with Dr. Jackson and ROBERT Lozada, SLP.  He is wasted and has had alot of nausea and has some aversion to food vapors.  UBW: ~120#    Nutrition Risk Screen    Nutrition Risk Screen:  (Involuntary WL)    Nutrition/Diet History    Patient Reported  "Diet/Restrictions/Preferences: general  Typical Food/Fluid Intake: 50%  Food Preferences: none stated  Spiritual, Cultural Beliefs, Restoration Practices, Values that Affect Care: no  Food Allergies: NKFA  Factors Affecting Nutritional Intake: decreased appetite,nausea/vomiting    Anthropometrics    Temp: 99.1 °F (37.3 °C)  Height Method: Estimated  Height: 5' 5" (165.1 cm)  Height (inches): 65 in  Weight Method: Stated  Weight: 50 kg (110 lb 3.7 oz)  Weight (lb): 110.23 lb  Ideal Body Weight (IBW), Male: 136 lb  % Ideal Body Weight, Male (lb): 81.05 %  BMI (Calculated): 18.3  BMI Grade: 17 - 18.4 protein-energy malnutrition grade I  Weight Loss: unintentional  Weight Change Amount:  (~20# involutary WL)  Amputation %: 5.9  Total Amputation %: 5.9  Amputation Ideal Body Weight (IBW), Male (lb): 130.1 lb  Amputee BMI (kg/m2): 19.55 kg/m2       Lab/Procedures/Meds    Pertinent Labs Reviewed: reviewed  Pertinent Labs Comments: Alb 1.8, K 3.4-improved, Hgb 8.8, Hct 26.8  Pertinent Medications Reviewed: reviewed  Pertinent Medications Comments: Lipitor, Zithromax, Rocephin, Lisinopril, MgCl, KCl    Physical Findings/Assessment         Estimated/Assessed Needs    Weight Used For Calorie Calculations: 59 kg (130 lb)  Energy Calorie Requirements (kcal): 7865-6187  Energy Need Method: Kcal/kg  Protein Requirements: 59-71  Weight Used For Protein Calculations: 59 kg (130 lb)     Estimated Fluid Requirement Method: RDA Method  RDA Method (mL): 1475         Nutrition Prescription Ordered    Current Diet Order: FL  Nutrition Order Comments: ST to trial with [facility] soft diet this afternoon.    Evaluation of Received Nutrient/Fluid Intake    Energy Calories Required: not meeting needs  Protein Required: not meeting needs  Fluid Required: not meeting needs  Tolerance: tolerating  % Intake of Estimated Energy Needs: 25 - 50 %  % Meal Intake: 25 - 50 %    Nutrition Risk    Level of Risk/Frequency of Follow-up: moderate - high "       Monitor and Evaluation    Food and Nutrient Intake: energy intake  Food and Nutrient Adminstration: diet order  Anthropometric Measurements: weight  Biochemical Data, Medical Tests and Procedures: electrolyte and renal panel  Nutrition-Focused Physical Findings: overall appearance       Nutrition Follow-Up     CT of abd and chest noted.

## 2022-01-13 NOTE — ASSESSMENT & PLAN NOTE
Patient additionally has not been eating and drinking as well now for several weeks which is concerning.  We will need to closely monitor his oral intake.   1.  Restart home PPI  2.  Encourage oral intake  3.  May need a speech eval for his swallow    1/13 eating /swallowing concerns much improved. He is taking diet and fluids and snacks.

## 2022-01-14 LAB
ANION GAP SERPL CALCULATED.3IONS-SCNC: 11 MMOL/L (ref 7–16)
BASOPHILS # BLD AUTO: 0.03 K/UL (ref 0–0.2)
BASOPHILS NFR BLD AUTO: 0.4 % (ref 0–1)
BUN SERPL-MCNC: 3 MG/DL (ref 7–18)
BUN/CREAT SERPL: 4 (ref 6–20)
CALCIUM SERPL-MCNC: 8.5 MG/DL (ref 8.5–10.1)
CHLORIDE SERPL-SCNC: 102 MMOL/L (ref 98–107)
CO2 SERPL-SCNC: 31 MMOL/L (ref 21–32)
CREAT SERPL-MCNC: 0.7 MG/DL (ref 0.7–1.3)
DIFFERENTIAL METHOD BLD: ABNORMAL
EOSINOPHIL # BLD AUTO: 0.03 K/UL (ref 0–0.5)
EOSINOPHIL NFR BLD AUTO: 0.4 % (ref 1–4)
ERYTHROCYTE [DISTWIDTH] IN BLOOD BY AUTOMATED COUNT: 12.8 % (ref 11.5–14.5)
GLUCOSE SERPL-MCNC: 91 MG/DL (ref 74–106)
HCT VFR BLD AUTO: 29.4 % (ref 40–54)
HGB BLD-MCNC: 9.7 G/DL (ref 13.5–18)
LYMPHOCYTES # BLD AUTO: 1.35 K/UL (ref 1–4.8)
LYMPHOCYTES NFR BLD AUTO: 17.2 % (ref 27–41)
LYMPHOCYTES NFR BLD MANUAL: 18 % (ref 27–41)
MCH RBC QN AUTO: 34.9 PG (ref 27–31)
MCHC RBC AUTO-ENTMCNC: 33 G/DL (ref 32–36)
MCV RBC AUTO: 105.8 FL (ref 80–96)
MONOCYTES # BLD AUTO: 0.9 K/UL (ref 0–0.8)
MONOCYTES NFR BLD AUTO: 11.5 % (ref 2–6)
MONOCYTES NFR BLD MANUAL: 15 % (ref 2–6)
MPC BLD CALC-MCNC: 10.6 FL (ref 9.4–12.4)
NEUTROPHILS # BLD AUTO: 5.55 K/UL (ref 1.8–7.7)
NEUTROPHILS NFR BLD AUTO: 70.5 % (ref 53–65)
NEUTS SEG NFR BLD MANUAL: 67 % (ref 50–62)
NRBC BLD MANUAL-RTO: ABNORMAL %
PLATELET # BLD AUTO: 183 K/UL (ref 150–400)
POTASSIUM SERPL-SCNC: 4.2 MMOL/L (ref 3.5–5.1)
RBC # BLD AUTO: 2.78 M/UL (ref 4.6–6.2)
SODIUM SERPL-SCNC: 140 MMOL/L (ref 136–145)
WBC # BLD AUTO: 7.86 K/UL (ref 4.5–11)

## 2022-01-14 PROCEDURE — 94640 AIRWAY INHALATION TREATMENT: CPT

## 2022-01-14 PROCEDURE — 80048 BASIC METABOLIC PNL TOTAL CA: CPT | Performed by: EMERGENCY MEDICINE

## 2022-01-14 PROCEDURE — 99232 SBSQ HOSP IP/OBS MODERATE 35: CPT | Mod: ,,, | Performed by: NURSE PRACTITIONER

## 2022-01-14 PROCEDURE — 94761 N-INVAS EAR/PLS OXIMETRY MLT: CPT

## 2022-01-14 PROCEDURE — 25000003 PHARM REV CODE 250

## 2022-01-14 PROCEDURE — 99232 PR SUBSEQUENT HOSPITAL CARE,LEVL II: ICD-10-PCS | Mod: ,,, | Performed by: NURSE PRACTITIONER

## 2022-01-14 PROCEDURE — 63600175 PHARM REV CODE 636 W HCPCS: Performed by: PEDIATRICS

## 2022-01-14 PROCEDURE — 85025 COMPLETE CBC W/AUTO DIFF WBC: CPT | Performed by: EMERGENCY MEDICINE

## 2022-01-14 PROCEDURE — 36415 COLL VENOUS BLD VENIPUNCTURE: CPT | Performed by: EMERGENCY MEDICINE

## 2022-01-14 PROCEDURE — 25000003 PHARM REV CODE 250: Performed by: PEDIATRICS

## 2022-01-14 PROCEDURE — 92526 ORAL FUNCTION THERAPY: CPT

## 2022-01-14 PROCEDURE — 27000221 HC OXYGEN, UP TO 24 HOURS

## 2022-01-14 PROCEDURE — 11000001 HC ACUTE MED/SURG PRIVATE ROOM

## 2022-01-14 PROCEDURE — 25000242 PHARM REV CODE 250 ALT 637 W/ HCPCS

## 2022-01-14 PROCEDURE — 25000003 PHARM REV CODE 250: Performed by: EMERGENCY MEDICINE

## 2022-01-14 RX ORDER — LACTULOSE 10 G/15ML
20 SOLUTION ORAL EVERY 6 HOURS PRN
Status: DISCONTINUED | OUTPATIENT
Start: 2022-01-14 | End: 2022-01-17 | Stop reason: HOSPADM

## 2022-01-14 RX ORDER — SODIUM CHLORIDE 9 MG/ML
INJECTION, SOLUTION INTRAVENOUS
Status: DISPENSED
Start: 2022-01-14 | End: 2022-01-15

## 2022-01-14 RX ORDER — POLYETHYLENE GLYCOL 3350 17 G/17G
17 POWDER, FOR SOLUTION ORAL DAILY
Status: DISCONTINUED | OUTPATIENT
Start: 2022-01-15 | End: 2022-01-17 | Stop reason: HOSPADM

## 2022-01-14 RX ADMIN — PANTOPRAZOLE SODIUM 40 MG: 40 TABLET, DELAYED RELEASE ORAL at 09:01

## 2022-01-14 RX ADMIN — ENOXAPARIN SODIUM 40 MG: 40 INJECTION SUBCUTANEOUS at 04:01

## 2022-01-14 RX ADMIN — MAGNESIUM 64 MG (MAGNESIUM CHLORIDE) TABLET,DELAYED RELEASE 64 MG: at 09:01

## 2022-01-14 RX ADMIN — CEFTRIAXONE SODIUM 2 G: 2 INJECTION, POWDER, FOR SOLUTION INTRAMUSCULAR; INTRAVENOUS at 04:01

## 2022-01-14 RX ADMIN — LISINOPRIL 2.5 MG: 2.5 TABLET ORAL at 09:01

## 2022-01-14 RX ADMIN — CLOPIDOGREL 75 MG: 75 TABLET, FILM COATED ORAL at 09:01

## 2022-01-14 RX ADMIN — AZITHROMYCIN MONOHYDRATE 500 MG: 500 INJECTION, POWDER, LYOPHILIZED, FOR SOLUTION INTRAVENOUS at 12:01

## 2022-01-14 RX ADMIN — IPRATROPIUM BROMIDE AND ALBUTEROL SULFATE 3 ML: 2.5; .5 SOLUTION RESPIRATORY (INHALATION) at 12:01

## 2022-01-14 RX ADMIN — IPRATROPIUM BROMIDE AND ALBUTEROL SULFATE 3 ML: 2.5; .5 SOLUTION RESPIRATORY (INHALATION) at 07:01

## 2022-01-14 RX ADMIN — ATORVASTATIN CALCIUM 80 MG: 40 TABLET, FILM COATED ORAL at 08:01

## 2022-01-14 RX ADMIN — LACTULOSE 20 G: 10 SOLUTION ORAL at 07:01

## 2022-01-14 RX ADMIN — IBUPROFEN 400 MG: 400 TABLET ORAL at 08:01

## 2022-01-14 RX ADMIN — ACETAMINOPHEN 650 MG: 325 TABLET, FILM COATED ORAL at 09:01

## 2022-01-14 RX ADMIN — SODIUM CHLORIDE 250 ML: 9 INJECTION, SOLUTION INTRAVENOUS at 04:01

## 2022-01-14 RX ADMIN — ONDANSETRON 4 MG: 2 INJECTION INTRAMUSCULAR; INTRAVENOUS at 07:01

## 2022-01-14 RX ADMIN — IPRATROPIUM BROMIDE AND ALBUTEROL SULFATE 3 ML: 2.5; .5 SOLUTION RESPIRATORY (INHALATION) at 06:01

## 2022-01-14 RX ADMIN — POTASSIUM CHLORIDE 20 MEQ: 1500 TABLET, EXTENDED RELEASE ORAL at 08:01

## 2022-01-14 RX ADMIN — IPRATROPIUM BROMIDE AND ALBUTEROL SULFATE 3 ML: 2.5; .5 SOLUTION RESPIRATORY (INHALATION) at 01:01

## 2022-01-14 RX ADMIN — POTASSIUM CHLORIDE 20 MEQ: 1500 TABLET, EXTENDED RELEASE ORAL at 09:01

## 2022-01-14 NOTE — ASSESSMENT & PLAN NOTE
Patient has low potassium but at this time, appears fairly asymptomatic from this.    1.  Potassium chloride 40 mEq three times daily  2.  Recheck BMP in the AM  3.  Fluids also have potassium     1/12 K+ is 2.4 this AM.  Will increase IV K+ and start oral magnesium  1/13 K+ 3.4 adjusting K+ therapy    01/14/22 Potassium 4.2 today  -Continue potassium chl 20 meq BID  -Continue to monitor BMP

## 2022-01-14 NOTE — PLAN OF CARE
Problem: Adult Inpatient Plan of Care  Goal: Plan of Care Review  Outcome: Ongoing, Progressing     Problem: Adult Inpatient Plan of Care  Goal: Patient-Specific Goal (Individualized)  Outcome: Ongoing, Progressing     Problem: Adult Inpatient Plan of Care  Goal: Absence of Hospital-Acquired Illness or Injury  Outcome: Ongoing, Progressing     Problem: Adult Inpatient Plan of Care  Goal: Optimal Comfort and Wellbeing  Outcome: Ongoing, Progressing     Problem: Infection (Pneumonia)  Goal: Resolution of Infection Signs and Symptoms  Outcome: Ongoing, Progressing     Problem: Fall Injury Risk  Goal: Absence of Fall and Fall-Related Injury  Outcome: Ongoing, Progressing

## 2022-01-14 NOTE — ASSESSMENT & PLAN NOTE
68 year old male who presents with cough and hypoxia with CXR demonstrating a right lower lobe pneumonia.  COVID testing is negative.    1.  Rocephin 2 gram IV daily   2.  Tylenol and motrin as needed for fevers    Day #2 IV Rocephin and Zithromax    1/13  CT Chest shows infiltrate. No Tumor identified. Needs F/U CT in a few weeks    01/14/22: Coarse breath sounds RLL only, O2 sat 98% on O2 at 2L/NC.   -Continue Rocephin IV 2 gm daily (day 3 of 5)   Continue Azithromycin  mg daily (day 3 of 5)  -Continue Duo nebs every 6 hours

## 2022-01-14 NOTE — ASSESSMENT & PLAN NOTE
Patient complains of ongoing nausea and suprapubic abdominal pain and also nausea which is preventing him from eating.  His urinalysis is unremarkable.  May need to continue monitoring intake closely.     1/13 resolved  01/14: No nausea, vomiting or abdominal pain. Eating and drinking has improved.

## 2022-01-14 NOTE — ASSESSMENT & PLAN NOTE
Patient additionally has not been eating and drinking as well now for several weeks which is concerning.  We will need to closely monitor his oral intake.   1.  Restart home PPI  2.  Encourage oral intake  3.  May need a speech eval for his swallow    1/13 eating /swallowing concerns much improved. He is taking diet and fluids and snacks.    01/14/22: BUN/CR 3/0.7, eating and drinking has improved.Tolerating diet and snacks.  -encourage po fluids  -Continue to monitor BMP

## 2022-01-14 NOTE — PT/OT/SLP PROGRESS
Discharge Report    Date of Last Treatment Session: 1/14/22  Past Medical History:   Diagnosis Date    Below knee amputation     High cholesterol     Hypertension     Pacemaker        Status at initiation of therapy:    Treatment Area(s):  Swallow    Met Goals: 4 out of 4 Total Goals    Participation in Treatment (at discharge):  Cooperative    Functional Status at time of Discharge:    Cognition: Patient demonstrates minimal cognitive deficits.    Communication: Patient demonstrates minimal communication deficits.    Language: Patient demonstrates no language deficits.    Motor Speech: Patient demonstrates minimal motor speech deficits.    Swallow: Patient demonstrates minimal dysphagia.                     Clinical Bedside assessment was completed on eval                       Instrumental assessment was not completed                                Swallowing Guidelines: Patient tolerating  mechanical soft             no help required    Patient is discharged to continued rehab at current facility                ST assessment:  Pt with goals met regarding consumption of diet with mechanical soft diet, thin liquids. Pt still noted with cough at times, staff and pt report cough throughout day at random, not consistent with meal intake only. Pt with increased appetite, pt did indicated this date that smells affect him and cause a gag reflex at times which results in coughing episode. Staff reports pt tolerating meds without difficulty and discharge scheduled for Monday.    Therapist: Jennifer Lozada M.S. CCC-SLP  Date: 1/14/2022

## 2022-01-14 NOTE — PROGRESS NOTES
[facility]  [facility]  Progress Note    Patient Name: Luis Manuel Mustafa  MRN: [id number]  Patient Class: IP- Inpatient   Admission Date: 1/11/2022  Length of Stay: 3 days  Attending Physician: Danis Jackson MD  Primary Care Provider: Farhana Olivier MD        Subjective:     Principal Problem:Pneumonia of right lower lobe due to infectious organism        HPI:  68 year old male with history of peripheral vascular disease and hypertension with a pacemaker   in place who presents for a number of complaints including shortness of breath, nausea and   poor appetite, and back pain. For his cough, patient states that he has had a cough for 2   weeks which is worsening. Patient has associated shortness of breath. No fevers. Patient   has no underlying pulmonary problems.     For his nausea, patient states that he has tightness in the abdomen. Patient is having difficulty   swallowing and states he hasn't been able to eat in several weeks. He additionally complains   of a headache in the frontal sinus region now for 2 weeks and chills. He states that he had one   loose stool two days ago.     For his back pain, patient states that he has been sleeping on the couch and attributes his back   pain to this.       Overview/Hospital Course:  1/12  He seems much better this AM. Has taken off O2. Sleeping well.             He has cc/o sever weight loss. He claims he has no dysphagia which contradicts his history with Dr Hernandez.     1/13  Day # IV Zithromax/Rocephin. He is swallowing today. Eating today. K+ better    01/14/22  Currently receiving IV Rocephin and Azithromycin for treatment of pneumonia. Receiving duo nebs every 6 hours. O2 sat 98% on O2 at 2L/NC. Has coarse breath sounds noted in RLL only. Reports that he is eating and drinking better. BUN/CR 3/0.7, K+ 4.2. Patient denies any new complaints today, no problems reported by nursing staff today.      Interval History: O2 SAT 98% on O2 at2L/NC, K+  4.2. Eating better, Improving.    Review of Systems   Constitutional: Positive for fatigue. Negative for chills and fever.   HENT: Negative.  Negative for ear pain, sinus pain and trouble swallowing.    Eyes: Negative for pain and visual disturbance.   Respiratory: Positive for cough. Negative for shortness of breath and wheezing.    Cardiovascular: Negative.    Gastrointestinal: Negative.  Negative for abdominal pain, diarrhea, nausea and vomiting.   Genitourinary: Negative.  Negative for dysuria and frequency.   Musculoskeletal: Negative.    Skin: Negative.  Negative for rash.   Neurological: Positive for weakness. Negative for dizziness, light-headedness and headaches.        Generalized weakness   Hematological: Negative.    Psychiatric/Behavioral: Negative.      Objective:     Vital Signs (Most Recent):  Temp: 98.4 °F (36.9 °C) (01/14/22 0730)  Pulse: 74 (01/14/22 0730)  Resp: 20 (01/14/22 0730)  BP: 119/61 (01/14/22 0730)  SpO2: 98 % (01/14/22 0730) Vital Signs (24h Range):  Temp:  [97.5 °F (36.4 °C)-101.2 °F (38.4 °C)] 98.4 °F (36.9 °C)  Pulse:  [] 74  Resp:  [16-20] 20  SpO2:  [92 %-100 %] 98 %  BP: (113-127)/(59-64) 119/61     Weight: 50 kg (110 lb 3.7 oz)  Body mass index is 18.34 kg/m².    Intake/Output Summary (Last 24 hours) at 1/14/2022 0811  Last data filed at 1/14/2022 0500  Gross per 24 hour   Intake 1317 ml   Output 1020 ml   Net 297 ml      Physical Exam  Vitals and nursing note reviewed.   Constitutional:       Appearance: Normal appearance. He is ill-appearing. He is not toxic-appearing or diaphoretic.   HENT:      Head: Normocephalic.      Right Ear: Hearing and external ear normal.      Left Ear: Hearing and external ear normal.      Nose: Nose normal.      Mouth/Throat:      Lips: Pink.      Mouth: Mucous membranes are moist.   Eyes:      General: Lids are normal.      Conjunctiva/sclera: Conjunctivae normal.      Pupils: Pupils are equal, round, and reactive to light.   Cardiovascular:       Rate and Rhythm: Normal rate and regular rhythm.      Pulses:           Radial pulses are 2+ on the right side and 2+ on the left side.        Posterior tibial pulses are 2+ on the right side.      Comments: Left AKA  Pulmonary:      Effort: Pulmonary effort is normal. No respiratory distress.      Breath sounds: Rhonchi present. No wheezing (minimal).      Comments: Coarse breath sounds noted in RLL  Abdominal:      General: Abdomen is flat. Bowel sounds are normal.      Palpations: Abdomen is soft.      Tenderness: There is no abdominal tenderness.   Musculoskeletal:         General: Normal range of motion.      Comments: Right bka   Lymphadenopathy:      Cervical: No cervical adenopathy.   Skin:     General: Skin is warm.      Capillary Refill: Capillary refill takes 2 to 3 seconds.      Findings: No rash.   Neurological:      General: No focal deficit present.      Mental Status: He is alert and oriented to person, place, and time. Mental status is at baseline.   Psychiatric:         Attention and Perception: Attention normal.         Mood and Affect: Mood normal.         Speech: Speech normal.         Behavior: Behavior is cooperative.         Thought Content: Thought content normal.         Judgment: Judgment normal.         Significant Labs:   All pertinent labs within the past 24 hours have been reviewed.  BMP:   Recent Labs   Lab 01/14/22  0439   GLU 91      K 4.2      CO2 31   BUN 3*   CREATININE 0.70   CALCIUM 8.5     CBC:   Recent Labs   Lab 01/13/22  0430 01/14/22  0439   WBC 9.67 7.86   HGB 8.8* 9.7*   HCT 26.6* 29.4*   * 183       Significant Imaging: I have reviewed all pertinent imaging results/findings within the past 24 hours.      Assessment/Plan:      * Pneumonia of right lower lobe due to infectious organism  68 year old male who presents with cough and hypoxia with CXR demonstrating a right lower lobe pneumonia.  COVID testing is negative.    1.  Rocephin 2 gram IV  daily   2.  Tylenol and motrin as needed for fevers    Day #2 IV Rocephin and Zithromax    1/13  CT Chest shows infiltrate. No Tumor identified. Needs F/U CT in a few weeks    01/14/22: Coarse breath sounds RLL only, O2 sat 98% on O2 at 2L/NC.   -Continue Rocephin IV 2 gm daily (day 3 of 5)   Continue Azithromycin  mg daily (day 3 of 5)  -Continue Duo nebs every 6 hours    Weight loss, unintentional  1/13 CT abdomen and also CT Chest for suspected Lung Mass and weight loss essentially negative for Cancer diagnosis    Will as for GI referral at discharge      Nausea  Patient complains of ongoing nausea and suprapubic abdominal pain and also nausea which is preventing him from eating.  His urinalysis is unremarkable.  May need to continue monitoring intake closely.     1/13 resolved  01/14: No nausea, vomiting or abdominal pain. Eating and drinking has improved.    Hypokalemia  Patient has low potassium but at this time, appears fairly asymptomatic from this.    1.  Potassium chloride 40 mEq three times daily  2.  Recheck BMP in the AM  3.  Fluids also have potassium     1/12 K+ is 2.4 this AM.  Will increase IV K+ and start oral magnesium  1/13 K+ 3.4 adjusting K+ therapy    01/14/22 Potassium 4.2 today  -Continue potassium chl 20 meq BID  -Continue to monitor BMP    Dehydration    Patient additionally has not been eating and drinking as well now for several weeks which is concerning.  We will need to closely monitor his oral intake.   1.  Restart home PPI  2.  Encourage oral intake  3.  May need a speech eval for his swallow    1/13 eating /swallowing concerns much improved. He is taking diet and fluids and snacks.    01/14/22: BUN/CR 3/0.7, eating and drinking has improved.Tolerating diet and snacks.  -encourage po fluids  -Continue to monitor BMP    VTE Risk Mitigation (From admission, onward)         Ordered     enoxaparin injection 40 mg  Daily         01/11/22 1016                Discharge Planning   CHANCE:       Code Status: Full Code   Is the patient medically ready for discharge?:     Reason for patient still in hospital (select all that apply): Treatment  Discharge Plan A: Home,Home Health          Discussed patient's case, labs and medications with Dr. Sarkar. He agreed to the above treatment.       STANFORD Hightower  Department of Hospital Medicine   [facility] - [facility] Unit

## 2022-01-14 NOTE — SUBJECTIVE & OBJECTIVE
Interval History: O2 SAT 98% on O2 at2L/NC, K+ 4.2. Eating better, Improving.    Review of Systems   Constitutional: Positive for fatigue. Negative for chills and fever.   HENT: Negative.  Negative for ear pain, sinus pain and trouble swallowing.    Eyes: Negative for pain and visual disturbance.   Respiratory: Positive for cough. Negative for shortness of breath and wheezing.    Cardiovascular: Negative.    Gastrointestinal: Negative.  Negative for abdominal pain, diarrhea, nausea and vomiting.   Genitourinary: Negative.  Negative for dysuria and frequency.   Musculoskeletal: Negative.    Skin: Negative.  Negative for rash.   Neurological: Positive for weakness. Negative for dizziness, light-headedness and headaches.        Generalized weakness   Hematological: Negative.    Psychiatric/Behavioral: Negative.      Objective:     Vital Signs (Most Recent):  Temp: 98.4 °F (36.9 °C) (01/14/22 0730)  Pulse: 74 (01/14/22 0730)  Resp: 20 (01/14/22 0730)  BP: 119/61 (01/14/22 0730)  SpO2: 98 % (01/14/22 0730) Vital Signs (24h Range):  Temp:  [97.5 °F (36.4 °C)-101.2 °F (38.4 °C)] 98.4 °F (36.9 °C)  Pulse:  [] 74  Resp:  [16-20] 20  SpO2:  [92 %-100 %] 98 %  BP: (113-127)/(59-64) 119/61     Weight: 50 kg (110 lb 3.7 oz)  Body mass index is 18.34 kg/m².    Intake/Output Summary (Last 24 hours) at 1/14/2022 0811  Last data filed at 1/14/2022 0500  Gross per 24 hour   Intake 1317 ml   Output 1020 ml   Net 297 ml      Physical Exam  Vitals and nursing note reviewed.   Constitutional:       Appearance: Normal appearance. He is ill-appearing. He is not toxic-appearing or diaphoretic.   HENT:      Head: Normocephalic.      Right Ear: Hearing and external ear normal.      Left Ear: Hearing and external ear normal.      Nose: Nose normal.      Mouth/Throat:      Lips: Pink.      Mouth: Mucous membranes are moist.   Eyes:      General: Lids are normal.      Conjunctiva/sclera: Conjunctivae normal.      Pupils: Pupils are equal,  round, and reactive to light.   Cardiovascular:      Rate and Rhythm: Normal rate and regular rhythm.      Pulses:           Radial pulses are 2+ on the right side and 2+ on the left side.        Posterior tibial pulses are 2+ on the right side.      Comments: Left AKA  Pulmonary:      Effort: Pulmonary effort is normal. No respiratory distress.      Breath sounds: Rhonchi present. No wheezing (minimal).      Comments: Coarse breath sounds noted in RLL  Abdominal:      General: Abdomen is flat. Bowel sounds are normal.      Palpations: Abdomen is soft.      Tenderness: There is no abdominal tenderness.   Musculoskeletal:         General: Normal range of motion.      Comments: Right bka   Lymphadenopathy:      Cervical: No cervical adenopathy.   Skin:     General: Skin is warm.      Capillary Refill: Capillary refill takes 2 to 3 seconds.      Findings: No rash.   Neurological:      General: No focal deficit present.      Mental Status: He is alert and oriented to person, place, and time. Mental status is at baseline.   Psychiatric:         Attention and Perception: Attention normal.         Mood and Affect: Mood normal.         Speech: Speech normal.         Behavior: Behavior is cooperative.         Thought Content: Thought content normal.         Judgment: Judgment normal.         Significant Labs:   All pertinent labs within the past 24 hours have been reviewed.  BMP:   Recent Labs   Lab 01/14/22  0439   GLU 91      K 4.2      CO2 31   BUN 3*   CREATININE 0.70   CALCIUM 8.5     CBC:   Recent Labs   Lab 01/13/22  0430 01/14/22  0439   WBC 9.67 7.86   HGB 8.8* 9.7*   HCT 26.6* 29.4*   * 183       Significant Imaging: I have reviewed all pertinent imaging results/findings within the past 24 hours.

## 2022-01-14 NOTE — PLAN OF CARE
Care plan reviewed  Problem: Infection (Pneumonia)  Goal: Resolution of Infection Signs and Symptoms  Outcome: Ongoing, Progressing     Problem: Respiratory Compromise (Pneumonia)  Goal: Effective Oxygenation and Ventilation  Outcome: Ongoing, Progressing     Problem: Fall Injury Risk  Goal: Absence of Fall and Fall-Related Injury  Outcome: Ongoing, Progressing

## 2022-01-15 LAB
ANION GAP SERPL CALCULATED.3IONS-SCNC: 10 MMOL/L (ref 7–16)
BASOPHILS # BLD AUTO: 0.01 K/UL (ref 0–0.2)
BASOPHILS NFR BLD AUTO: 0.2 % (ref 0–1)
BASOPHILS NFR BLD MANUAL: 1 % (ref 0–1)
BUN SERPL-MCNC: 3 MG/DL (ref 7–18)
BUN/CREAT SERPL: 4 (ref 6–20)
CALCIUM SERPL-MCNC: 8.3 MG/DL (ref 8.5–10.1)
CHLORIDE SERPL-SCNC: 103 MMOL/L (ref 98–107)
CO2 SERPL-SCNC: 31 MMOL/L (ref 21–32)
CREAT SERPL-MCNC: 0.81 MG/DL (ref 0.7–1.3)
DIFFERENTIAL METHOD BLD: ABNORMAL
EOSINOPHIL # BLD AUTO: 0.08 K/UL (ref 0–0.5)
EOSINOPHIL NFR BLD AUTO: 1.4 % (ref 1–4)
EOSINOPHIL NFR BLD MANUAL: 1 % (ref 1–4)
ERYTHROCYTE [DISTWIDTH] IN BLOOD BY AUTOMATED COUNT: 12.7 % (ref 11.5–14.5)
GLUCOSE SERPL-MCNC: 100 MG/DL (ref 74–106)
HCT VFR BLD AUTO: 28.3 % (ref 40–54)
HGB BLD-MCNC: 9.3 G/DL (ref 13.5–18)
LYMPHOCYTES # BLD AUTO: 1.13 K/UL (ref 1–4.8)
LYMPHOCYTES NFR BLD AUTO: 20.4 % (ref 27–41)
LYMPHOCYTES NFR BLD MANUAL: 22 % (ref 27–41)
MACROCYTES BLD QL SMEAR: ABNORMAL
MCH RBC QN AUTO: 34.8 PG (ref 27–31)
MCHC RBC AUTO-ENTMCNC: 32.9 G/DL (ref 32–36)
MCV RBC AUTO: 106 FL (ref 80–96)
MONOCYTES # BLD AUTO: 0.5 K/UL (ref 0–0.8)
MONOCYTES NFR BLD AUTO: 9 % (ref 2–6)
MONOCYTES NFR BLD MANUAL: 7 % (ref 2–6)
MPC BLD CALC-MCNC: 9.8 FL (ref 9.4–12.4)
NEUTROPHILS # BLD AUTO: 3.82 K/UL (ref 1.8–7.7)
NEUTROPHILS NFR BLD AUTO: 69 % (ref 53–65)
NEUTS BAND NFR BLD MANUAL: 1 % (ref 1–5)
NEUTS SEG NFR BLD MANUAL: 68 % (ref 50–62)
NRBC BLD MANUAL-RTO: ABNORMAL %
PLATELET # BLD AUTO: 218 K/UL (ref 150–400)
POTASSIUM SERPL-SCNC: 4.2 MMOL/L (ref 3.5–5.1)
RBC # BLD AUTO: 2.67 M/UL (ref 4.6–6.2)
SODIUM SERPL-SCNC: 140 MMOL/L (ref 136–145)
WBC # BLD AUTO: 5.54 K/UL (ref 4.5–11)

## 2022-01-15 PROCEDURE — 25000242 PHARM REV CODE 250 ALT 637 W/ HCPCS

## 2022-01-15 PROCEDURE — 94640 AIRWAY INHALATION TREATMENT: CPT

## 2022-01-15 PROCEDURE — 25000003 PHARM REV CODE 250: Performed by: PEDIATRICS

## 2022-01-15 PROCEDURE — 99231 SBSQ HOSP IP/OBS SF/LOW 25: CPT | Mod: ,,, | Performed by: NURSE PRACTITIONER

## 2022-01-15 PROCEDURE — 36415 COLL VENOUS BLD VENIPUNCTURE: CPT | Performed by: EMERGENCY MEDICINE

## 2022-01-15 PROCEDURE — 25000003 PHARM REV CODE 250: Performed by: EMERGENCY MEDICINE

## 2022-01-15 PROCEDURE — 99231 PR SUBSEQUENT HOSPITAL CARE,LEVL I: ICD-10-PCS | Mod: ,,, | Performed by: NURSE PRACTITIONER

## 2022-01-15 PROCEDURE — 27000221 HC OXYGEN, UP TO 24 HOURS

## 2022-01-15 PROCEDURE — 85025 COMPLETE CBC W/AUTO DIFF WBC: CPT | Performed by: EMERGENCY MEDICINE

## 2022-01-15 PROCEDURE — 94761 N-INVAS EAR/PLS OXIMETRY MLT: CPT

## 2022-01-15 PROCEDURE — 63600175 PHARM REV CODE 636 W HCPCS: Performed by: PEDIATRICS

## 2022-01-15 PROCEDURE — 80048 BASIC METABOLIC PNL TOTAL CA: CPT | Performed by: EMERGENCY MEDICINE

## 2022-01-15 PROCEDURE — 11000001 HC ACUTE MED/SURG PRIVATE ROOM

## 2022-01-15 RX ADMIN — IPRATROPIUM BROMIDE AND ALBUTEROL SULFATE 3 ML: 2.5; .5 SOLUTION RESPIRATORY (INHALATION) at 07:01

## 2022-01-15 RX ADMIN — IPRATROPIUM BROMIDE AND ALBUTEROL SULFATE 3 ML: 2.5; .5 SOLUTION RESPIRATORY (INHALATION) at 02:01

## 2022-01-15 RX ADMIN — MAGNESIUM 64 MG (MAGNESIUM CHLORIDE) TABLET,DELAYED RELEASE 64 MG: at 09:01

## 2022-01-15 RX ADMIN — POTASSIUM CHLORIDE 20 MEQ: 1500 TABLET, EXTENDED RELEASE ORAL at 09:01

## 2022-01-15 RX ADMIN — CEFTRIAXONE SODIUM 2 G: 2 INJECTION, POWDER, FOR SOLUTION INTRAMUSCULAR; INTRAVENOUS at 04:01

## 2022-01-15 RX ADMIN — IPRATROPIUM BROMIDE AND ALBUTEROL SULFATE 3 ML: 2.5; .5 SOLUTION RESPIRATORY (INHALATION) at 12:01

## 2022-01-15 RX ADMIN — PANTOPRAZOLE SODIUM 40 MG: 40 TABLET, DELAYED RELEASE ORAL at 09:01

## 2022-01-15 RX ADMIN — LACTULOSE 20 G: 10 SOLUTION ORAL at 06:01

## 2022-01-15 RX ADMIN — SODIUM CHLORIDE: 9 INJECTION, SOLUTION INTRAVENOUS at 04:01

## 2022-01-15 RX ADMIN — LISINOPRIL 2.5 MG: 2.5 TABLET ORAL at 09:01

## 2022-01-15 RX ADMIN — ENOXAPARIN SODIUM 40 MG: 40 INJECTION SUBCUTANEOUS at 04:01

## 2022-01-15 RX ADMIN — AZITHROMYCIN MONOHYDRATE 500 MG: 500 INJECTION, POWDER, LYOPHILIZED, FOR SOLUTION INTRAVENOUS at 12:01

## 2022-01-15 RX ADMIN — POTASSIUM CHLORIDE 20 MEQ: 1500 TABLET, EXTENDED RELEASE ORAL at 08:01

## 2022-01-15 RX ADMIN — CLOPIDOGREL 75 MG: 75 TABLET, FILM COATED ORAL at 09:01

## 2022-01-15 RX ADMIN — ATORVASTATIN CALCIUM 80 MG: 40 TABLET, FILM COATED ORAL at 08:01

## 2022-01-15 RX ADMIN — POLYETHYLENE GLYCOL 3350 17 G: 17 POWDER, FOR SOLUTION ORAL at 09:01

## 2022-01-15 NOTE — PLAN OF CARE
Patient was sleeping when tech entered room. NC had slipped off and his 02 sat. Was 82% on room air.  Put o2 back on and gave neb tx. Sat. Increased to 98%. HR was 84 and RR16.    Problem: Infection (Pneumonia)  Goal: Resolution of Infection Signs and Symptoms  Outcome: Ongoing, Progressing     Problem: Respiratory Compromise (Pneumonia)  Goal: Effective Oxygenation and Ventilation  Outcome: Ongoing, Progressing  Intervention: Promote Airway Secretion Clearance  Flowsheets (Taken 1/15/2022 0836)  Breathing Techniques/Airway Clearance: deep/controlled cough encouraged  Cough And Deep Breathing: done independently per patient  Intervention: Optimize Oxygenation and Ventilation  Flowsheets (Taken 1/15/2022 0836)  Airway/Ventilation Management:   airway patency maintained   calming measures promoted  Head of Bed (HOB) Positioning: HOB at 20-30 degrees

## 2022-01-15 NOTE — ASSESSMENT & PLAN NOTE
Patient has low potassium but at this time, appears fairly asymptomatic from this.    1.  Potassium chloride 40 mEq three times daily  2.  Recheck BMP in the AM  3.  Fluids also have potassium     1/12 K+ is 2.4 this AM.  Will increase IV K+ and start oral magnesium  1/13 K+ 3.4 adjusting K+ therapy    01/14/22 Potassium 4.2 today  -Continue potassium chl 20 meq BID  -Continue to monitor BMP    01/15/22: K+ 4.2  -BMP in am

## 2022-01-15 NOTE — ASSESSMENT & PLAN NOTE
68 year old male who presents with cough and hypoxia with CXR demonstrating a right lower lobe pneumonia.  COVID testing is negative.    1.  Rocephin 2 gram IV daily   2.  Tylenol and motrin as needed for fevers    Day #2 IV Rocephin and Zithromax    1/13  CT Chest shows infiltrate. No Tumor identified. Needs F/U CT in a few weeks    01/14/22: Coarse breath sounds RLL only, O2 sat 98% on O2 at 2L/NC.   -Continue Rocephin IV 2 gm daily (day 3 of 5)   Continue Azithromycin  mg daily (day 3 of 5)  -Continue Duo nebs every 6 hours    01/15/22: Lungs clear to auscultation, O2 sat 98% on room air.   -Continue Rocephin IV 2 gm daily (day 4 of 5)   Continue Azithromycin  mg daily (day 4 of 5)

## 2022-01-15 NOTE — SUBJECTIVE & OBJECTIVE
Interval History: improving. O2 sat 98% on room air. Lungs clear. Eating and drinking well. K+ 4.2.    Review of Systems   Constitutional: Positive for fatigue. Negative for chills and fever.   HENT: Negative.  Negative for ear pain, sinus pain and trouble swallowing.    Eyes: Negative for pain and visual disturbance.   Respiratory: Positive for cough. Negative for shortness of breath and wheezing.    Cardiovascular: Negative.    Gastrointestinal: Negative.  Negative for abdominal pain, diarrhea, nausea and vomiting.   Genitourinary: Negative.  Negative for dysuria and frequency.   Musculoskeletal: Negative.    Skin: Negative.  Negative for rash.   Neurological: Positive for weakness. Negative for dizziness, light-headedness and headaches.        Generalized weakness   Hematological: Negative.    Psychiatric/Behavioral: Negative.      Objective:     Vital Signs (Most Recent):  Temp: 98.6 °F (37 °C) (01/15/22 0330)  Pulse: 84 (01/15/22 0721)  Resp: 16 (01/15/22 0721)  BP: 97/63 (01/15/22 0330)  SpO2: (!) 82 % (01/15/22 0721) Vital Signs (24h Range):  Temp:  [97.7 °F (36.5 °C)-99.5 °F (37.5 °C)] 98.6 °F (37 °C)  Pulse:  [81-96] 84  Resp:  [16-21] 16  SpO2:  [82 %-99 %] 82 %  BP: ()/(61-66) 97/63     Weight: 50 kg (110 lb 3.7 oz)  Body mass index is 18.34 kg/m².    Intake/Output Summary (Last 24 hours) at 1/15/2022 0748  Last data filed at 1/15/2022 0600  Gross per 24 hour   Intake 1515 ml   Output 900 ml   Net 615 ml      Physical Exam  Vitals and nursing note reviewed.   Constitutional:       Appearance: Normal appearance. He is ill-appearing. He is not toxic-appearing or diaphoretic.   HENT:      Head: Normocephalic.      Right Ear: Hearing and external ear normal.      Left Ear: Hearing and external ear normal.      Nose: Nose normal.      Mouth/Throat:      Lips: Pink.      Mouth: Mucous membranes are moist.   Eyes:      General: Lids are normal.      Conjunctiva/sclera: Conjunctivae normal.      Pupils:  Pupils are equal, round, and reactive to light.   Cardiovascular:      Rate and Rhythm: Normal rate and regular rhythm.      Pulses:           Radial pulses are 2+ on the right side and 2+ on the left side.        Posterior tibial pulses are 2+ on the right side.      Comments: Left BKA  Pulmonary:      Effort: Pulmonary effort is normal. No respiratory distress.      Breath sounds: Normal breath sounds and air entry. No wheezing (minimal) or rhonchi.   Abdominal:      General: Abdomen is flat. Bowel sounds are normal.      Palpations: Abdomen is soft.      Tenderness: There is no abdominal tenderness.   Musculoskeletal:         General: Normal range of motion.   Lymphadenopathy:      Cervical: No cervical adenopathy.   Skin:     General: Skin is warm.      Capillary Refill: Capillary refill takes 2 to 3 seconds.      Findings: No rash.   Neurological:      General: No focal deficit present.      Mental Status: He is alert and oriented to person, place, and time. Mental status is at baseline.   Psychiatric:         Attention and Perception: Attention normal.         Mood and Affect: Mood normal.         Speech: Speech normal.         Behavior: Behavior is cooperative.         Thought Content: Thought content normal.         Judgment: Judgment normal.         Significant Labs:   All pertinent labs within the past 24 hours have been reviewed.  BMP:   Recent Labs   Lab 01/15/22  0540         K 4.2      CO2 31   BUN 3*   CREATININE 0.81   CALCIUM 8.3*     CBC:   Recent Labs   Lab 01/14/22  0439 01/15/22  0540   WBC 7.86 5.54   HGB 9.7* 9.3*   HCT 29.4* 28.3*    218       Significant Imaging: No new imaging

## 2022-01-15 NOTE — PROGRESS NOTES
[facility]  [facility] Medicine  Progress Note    Patient Name: Luis Manuel Mustafa  MRN: [id number]  Patient Class: IP- Inpatient   Admission Date: 1/11/2022  Length of Stay: 4 days  Attending Physician: Danis Jackson MD  Primary Care Provider: Farhana Olivier MD        Subjective:     Principal Problem:Pneumonia of right lower lobe due to infectious organism        HPI:  68 year old male with history of peripheral vascular disease and hypertension with a pacemaker   in place who presents for a number of complaints including shortness of breath, nausea and   poor appetite, and back pain. For his cough, patient states that he has had a cough for 2   weeks which is worsening. Patient has associated shortness of breath. No fevers. Patient   has no underlying pulmonary problems.     For his nausea, patient states that he has tightness in the abdomen. Patient is having difficulty   swallowing and states he hasn't been able to eat in several weeks. He additionally complains   of a headache in the frontal sinus region now for 2 weeks and chills. He states that he had one   loose stool two days ago.     For his back pain, patient states that he has been sleeping on the couch and attributes his back   pain to this.       Overview/Hospital Course:  1/12  He seems much better this AM. Has taken off O2. Sleeping well.             He has cc/o sever weight loss. He claims he has no dysphagia which contradicts his history with Dr Hernandez.     1/13  Day # IV Zithromax/Rocephin. He is swallowing today. Eating today. K+ better    01/14/22  Currently receiving IV Rocephin and Azithromycin for treatment of pneumonia. Receiving duo nebs every 6 hours. O2 sat 98% on O2 at 2L/NC. Has coarse breath sounds noted in RLL only. Reports that he is eating and drinking better. BUN/CR 3/0.7, K+ 4.2. Patient denies any new complaints today, no problems reported by nursing staff today.    01/15/22: Patient on day 4 of 5 for IV Rocephin and  Azithromycin. Continues to eat and drink well. Has O2 off at present. Reports feeling better. Lungs clear to auscultation. O2 sat 98% this am. BUN/CR 3/0.81, K+ 4.2. Preliminary BC from 01/11 no growth. Denies any new complaints today, no problems reported by staff.       Interval History: improving. O2 sat 98% on room air. Lungs clear. Eating and drinking well. K+ 4.2.    Review of Systems   Constitutional: Positive for fatigue. Negative for chills and fever.   HENT: Negative.  Negative for ear pain, sinus pain and trouble swallowing.    Eyes: Negative for pain and visual disturbance.   Respiratory: Positive for cough. Negative for shortness of breath and wheezing.    Cardiovascular: Negative.    Gastrointestinal: Negative.  Negative for abdominal pain, diarrhea, nausea and vomiting.   Genitourinary: Negative.  Negative for dysuria and frequency.   Musculoskeletal: Negative.    Skin: Negative.  Negative for rash.   Neurological: Positive for weakness. Negative for dizziness, light-headedness and headaches.        Generalized weakness   Hematological: Negative.    Psychiatric/Behavioral: Negative.      Objective:     Vital Signs (Most Recent):  Temp: 98.6 °F (37 °C) (01/15/22 0330)  Pulse: 84 (01/15/22 0721)  Resp: 16 (01/15/22 0721)  BP: 97/63 (01/15/22 0330)  SpO2: (!) 82 % (01/15/22 0721) Vital Signs (24h Range):  Temp:  [97.7 °F (36.5 °C)-99.5 °F (37.5 °C)] 98.6 °F (37 °C)  Pulse:  [81-96] 84  Resp:  [16-21] 16  SpO2:  [82 %-99 %] 82 %  BP: ()/(61-66) 97/63     Weight: 50 kg (110 lb 3.7 oz)  Body mass index is 18.34 kg/m².    Intake/Output Summary (Last 24 hours) at 1/15/2022 0748  Last data filed at 1/15/2022 0600  Gross per 24 hour   Intake 1515 ml   Output 900 ml   Net 615 ml      Physical Exam  Vitals and nursing note reviewed.   Constitutional:       Appearance: Normal appearance. He is ill-appearing. He is not toxic-appearing or diaphoretic.   HENT:      Head: Normocephalic.      Right Ear: Hearing  and external ear normal.      Left Ear: Hearing and external ear normal.      Nose: Nose normal.      Mouth/Throat:      Lips: Pink.      Mouth: Mucous membranes are moist.   Eyes:      General: Lids are normal.      Conjunctiva/sclera: Conjunctivae normal.      Pupils: Pupils are equal, round, and reactive to light.   Cardiovascular:      Rate and Rhythm: Normal rate and regular rhythm.      Pulses:           Radial pulses are 2+ on the right side and 2+ on the left side.        Posterior tibial pulses are 2+ on the right side.      Comments: Left BKA  Pulmonary:      Effort: Pulmonary effort is normal. No respiratory distress.      Breath sounds: Normal breath sounds and air entry. No wheezing (minimal) or rhonchi.   Abdominal:      General: Abdomen is flat. Bowel sounds are normal.      Palpations: Abdomen is soft.      Tenderness: There is no abdominal tenderness.   Musculoskeletal:         General: Normal range of motion.   Lymphadenopathy:      Cervical: No cervical adenopathy.   Skin:     General: Skin is warm.      Capillary Refill: Capillary refill takes 2 to 3 seconds.      Findings: No rash.   Neurological:      General: No focal deficit present.      Mental Status: He is alert and oriented to person, place, and time. Mental status is at baseline.   Psychiatric:         Attention and Perception: Attention normal.         Mood and Affect: Mood normal.         Speech: Speech normal.         Behavior: Behavior is cooperative.         Thought Content: Thought content normal.         Judgment: Judgment normal.         Significant Labs:   All pertinent labs within the past 24 hours have been reviewed.  BMP:   Recent Labs   Lab 01/15/22  0540         K 4.2      CO2 31   BUN 3*   CREATININE 0.81   CALCIUM 8.3*     CBC:   Recent Labs   Lab 01/14/22  0439 01/15/22  0540   WBC 7.86 5.54   HGB 9.7* 9.3*   HCT 29.4* 28.3*    218       Significant Imaging: No new imaging      Assessment/Plan:       * Pneumonia of right lower lobe due to infectious organism  68 year old male who presents with cough and hypoxia with CXR demonstrating a right lower lobe pneumonia.  COVID testing is negative.    1.  Rocephin 2 gram IV daily   2.  Tylenol and motrin as needed for fevers    Day #2 IV Rocephin and Zithromax    1/13  CT Chest shows infiltrate. No Tumor identified. Needs F/U CT in a few weeks    01/14/22: Coarse breath sounds RLL only, O2 sat 98% on O2 at 2L/NC.   -Continue Rocephin IV 2 gm daily (day 3 of 5)   Continue Azithromycin  mg daily (day 3 of 5)  -Continue Duo nebs every 6 hours    01/15/22: Lungs clear to auscultation, O2 sat 98% on room air.   -Continue Rocephin IV 2 gm daily (day 4 of 5)   Continue Azithromycin  mg daily (day 4 of 5)      Weight loss, unintentional  1/13 CT abdomen and also CT Chest for suspected Lung Mass and weight loss essentially negative for Cancer diagnosis    Will as for GI referral at discharge      Nausea  Patient complains of ongoing nausea and suprapubic abdominal pain and also nausea which is preventing him from eating.  His urinalysis is unremarkable.  May need to continue monitoring intake closely.     1/13 resolved  01/14: No nausea, vomiting or abdominal pain. Eating and drinking has improved.    Hypokalemia  Patient has low potassium but at this time, appears fairly asymptomatic from this.    1.  Potassium chloride 40 mEq three times daily  2.  Recheck BMP in the AM  3.  Fluids also have potassium     1/12 K+ is 2.4 this AM.  Will increase IV K+ and start oral magnesium  1/13 K+ 3.4 adjusting K+ therapy    01/14/22 Potassium 4.2 today  -Continue potassium chl 20 meq BID  -Continue to monitor BMP    01/15/22: K+ 4.2  -BMP in am    Dehydration    Patient additionally has not been eating and drinking as well now for several weeks which is concerning.  We will need to closely monitor his oral intake.   1.  Restart home PPI  2.  Encourage oral intake  3.  May  need a speech eval for his swallow    1/13 eating /swallowing concerns much improved. He is taking diet and fluids and snacks.    01/14/22: BUN/CR 3/0.7, eating and drinking has improved.Tolerating diet and snacks.  -encourage po fluids  -Continue to monitor BMP    01/15/22: BUN/CR 3/0.81. Continues to eat and drink well.    VTE Risk Mitigation (From admission, onward)         Ordered     enoxaparin injection 40 mg  Daily         01/11/22 0084                Discharge Planning   CHANCE:      Code Status: Full Code   Is the patient medically ready for discharge?:     Reason for patient still in hospital (select all that apply): Treatment  Discharge Plan A: Home,Home Health          Discussed patient's case, lab and medications with Dr. Sarkar. He agreed with no change in current POC.      Em Benz, [place]  Department of Hospital Medicine   [place] - [facility] Surgical Unit

## 2022-01-15 NOTE — PLAN OF CARE
Problem: Adult Inpatient Plan of Care  Goal: Plan of Care Review  Outcome: Ongoing, Progressing  Goal: Patient-Specific Goal (Individualized)  Outcome: Ongoing, Progressing  Goal: Absence of Hospital-Acquired Illness or Injury  Outcome: Ongoing, Progressing  Goal: Optimal Comfort and Wellbeing  Outcome: Ongoing, Progressing     Problem: Fluid Imbalance (Pneumonia)  Goal: Fluid Balance  Outcome: Ongoing, Progressing     Problem: Infection (Pneumonia)  Goal: Resolution of Infection Signs and Symptoms  Outcome: Ongoing, Progressing     Problem: Respiratory Compromise (Pneumonia)  Goal: Effective Oxygenation and Ventilation  Outcome: Ongoing, Progressing     Problem: Skin Injury Risk Increased  Goal: Skin Health and Integrity  Outcome: Ongoing, Progressing     Problem: Fall Injury Risk  Goal: Absence of Fall and Fall-Related Injury  Outcome: Ongoing, Progressing

## 2022-01-15 NOTE — ASSESSMENT & PLAN NOTE
Patient additionally has not been eating and drinking as well now for several weeks which is concerning.  We will need to closely monitor his oral intake.   1.  Restart home PPI  2.  Encourage oral intake  3.  May need a speech eval for his swallow    1/13 eating /swallowing concerns much improved. He is taking diet and fluids and snacks.    01/14/22: BUN/CR 3/0.7, eating and drinking has improved.Tolerating diet and snacks.  -encourage po fluids  -Continue to monitor BMP    01/15/22: BUN/CR 3/0.81. Continues to eat and drink well.

## 2022-01-16 LAB
ANION GAP SERPL CALCULATED.3IONS-SCNC: 14 MMOL/L (ref 7–16)
BASOPHILS # BLD AUTO: 0.01 K/UL (ref 0–0.2)
BASOPHILS NFR BLD AUTO: 0.2 % (ref 0–1)
BUN SERPL-MCNC: 2 MG/DL (ref 7–18)
BUN/CREAT SERPL: 3 (ref 6–20)
CALCIUM SERPL-MCNC: 8 MG/DL (ref 8.5–10.1)
CHLORIDE SERPL-SCNC: 101 MMOL/L (ref 98–107)
CO2 SERPL-SCNC: 29 MMOL/L (ref 21–32)
CREAT SERPL-MCNC: 0.74 MG/DL (ref 0.7–1.3)
DIFFERENTIAL METHOD BLD: ABNORMAL
EOSINOPHIL # BLD AUTO: 0.07 K/UL (ref 0–0.5)
EOSINOPHIL NFR BLD AUTO: 1.6 % (ref 1–4)
EOSINOPHIL NFR BLD MANUAL: 1 % (ref 1–4)
ERYTHROCYTE [DISTWIDTH] IN BLOOD BY AUTOMATED COUNT: 12.8 % (ref 11.5–14.5)
GLUCOSE SERPL-MCNC: 97 MG/DL (ref 74–106)
HCT VFR BLD AUTO: 28.6 % (ref 40–54)
HGB BLD-MCNC: 9.6 G/DL (ref 13.5–18)
LYMPHOCYTES # BLD AUTO: 1.06 K/UL (ref 1–4.8)
LYMPHOCYTES NFR BLD AUTO: 24.8 % (ref 27–41)
LYMPHOCYTES NFR BLD MANUAL: 23 % (ref 27–41)
MCH RBC QN AUTO: 35.2 PG (ref 27–31)
MCHC RBC AUTO-ENTMCNC: 33.6 G/DL (ref 32–36)
MCV RBC AUTO: 104.8 FL (ref 80–96)
MONOCYTES # BLD AUTO: 0.58 K/UL (ref 0–0.8)
MONOCYTES NFR BLD AUTO: 13.6 % (ref 2–6)
MONOCYTES NFR BLD MANUAL: 10 % (ref 2–6)
MPC BLD CALC-MCNC: 10.2 FL (ref 9.4–12.4)
NEUTROPHILS # BLD AUTO: 2.56 K/UL (ref 1.8–7.7)
NEUTROPHILS NFR BLD AUTO: 59.8 % (ref 53–65)
NEUTS SEG NFR BLD MANUAL: 66 % (ref 50–62)
NRBC BLD MANUAL-RTO: ABNORMAL %
PLATELET # BLD AUTO: 235 K/UL (ref 150–400)
POTASSIUM SERPL-SCNC: 4.3 MMOL/L (ref 3.5–5.1)
RBC # BLD AUTO: 2.73 M/UL (ref 4.6–6.2)
SODIUM SERPL-SCNC: 140 MMOL/L (ref 136–145)
WBC # BLD AUTO: 4.28 K/UL (ref 4.5–11)

## 2022-01-16 PROCEDURE — 25000242 PHARM REV CODE 250 ALT 637 W/ HCPCS

## 2022-01-16 PROCEDURE — 80048 BASIC METABOLIC PNL TOTAL CA: CPT | Performed by: EMERGENCY MEDICINE

## 2022-01-16 PROCEDURE — 85025 COMPLETE CBC W/AUTO DIFF WBC: CPT | Performed by: EMERGENCY MEDICINE

## 2022-01-16 PROCEDURE — 99232 SBSQ HOSP IP/OBS MODERATE 35: CPT | Mod: ,,, | Performed by: NURSE PRACTITIONER

## 2022-01-16 PROCEDURE — 63600175 PHARM REV CODE 636 W HCPCS: Performed by: PEDIATRICS

## 2022-01-16 PROCEDURE — 99232 PR SUBSEQUENT HOSPITAL CARE,LEVL II: ICD-10-PCS | Mod: ,,, | Performed by: NURSE PRACTITIONER

## 2022-01-16 PROCEDURE — 25000003 PHARM REV CODE 250: Performed by: EMERGENCY MEDICINE

## 2022-01-16 PROCEDURE — 99900035 HC TECH TIME PER 15 MIN (STAT)

## 2022-01-16 PROCEDURE — 94640 AIRWAY INHALATION TREATMENT: CPT

## 2022-01-16 PROCEDURE — 11000001 HC ACUTE MED/SURG PRIVATE ROOM

## 2022-01-16 PROCEDURE — 36415 COLL VENOUS BLD VENIPUNCTURE: CPT | Performed by: EMERGENCY MEDICINE

## 2022-01-16 PROCEDURE — 27000221 HC OXYGEN, UP TO 24 HOURS

## 2022-01-16 PROCEDURE — 94761 N-INVAS EAR/PLS OXIMETRY MLT: CPT

## 2022-01-16 PROCEDURE — 25000003 PHARM REV CODE 250: Performed by: PEDIATRICS

## 2022-01-16 RX ADMIN — ENOXAPARIN SODIUM 40 MG: 40 INJECTION SUBCUTANEOUS at 05:01

## 2022-01-16 RX ADMIN — ACETAMINOPHEN 650 MG: 325 TABLET, FILM COATED ORAL at 10:01

## 2022-01-16 RX ADMIN — POTASSIUM CHLORIDE 20 MEQ: 1500 TABLET, EXTENDED RELEASE ORAL at 09:01

## 2022-01-16 RX ADMIN — MAGNESIUM 64 MG (MAGNESIUM CHLORIDE) TABLET,DELAYED RELEASE 64 MG: at 09:01

## 2022-01-16 RX ADMIN — CLOPIDOGREL 75 MG: 75 TABLET, FILM COATED ORAL at 09:01

## 2022-01-16 RX ADMIN — CEFTRIAXONE SODIUM 2 G: 2 INJECTION, POWDER, FOR SOLUTION INTRAMUSCULAR; INTRAVENOUS at 05:01

## 2022-01-16 RX ADMIN — POTASSIUM CHLORIDE 20 MEQ: 1500 TABLET, EXTENDED RELEASE ORAL at 08:01

## 2022-01-16 RX ADMIN — IPRATROPIUM BROMIDE AND ALBUTEROL SULFATE 3 ML: 2.5; .5 SOLUTION RESPIRATORY (INHALATION) at 07:01

## 2022-01-16 RX ADMIN — ACETAMINOPHEN 650 MG: 325 TABLET, FILM COATED ORAL at 02:01

## 2022-01-16 RX ADMIN — IBUPROFEN 400 MG: 400 TABLET ORAL at 01:01

## 2022-01-16 RX ADMIN — IPRATROPIUM BROMIDE AND ALBUTEROL SULFATE 3 ML: 2.5; .5 SOLUTION RESPIRATORY (INHALATION) at 01:01

## 2022-01-16 RX ADMIN — ATORVASTATIN CALCIUM 80 MG: 40 TABLET, FILM COATED ORAL at 08:01

## 2022-01-16 RX ADMIN — PANTOPRAZOLE SODIUM 40 MG: 40 TABLET, DELAYED RELEASE ORAL at 09:01

## 2022-01-16 RX ADMIN — LISINOPRIL 2.5 MG: 2.5 TABLET ORAL at 09:01

## 2022-01-16 RX ADMIN — POLYETHYLENE GLYCOL 3350 17 G: 17 POWDER, FOR SOLUTION ORAL at 01:01

## 2022-01-16 RX ADMIN — SODIUM CHLORIDE 250 ML: 9 INJECTION, SOLUTION INTRAVENOUS at 05:01

## 2022-01-16 RX ADMIN — AZITHROMYCIN MONOHYDRATE 500 MG: 500 INJECTION, POWDER, LYOPHILIZED, FOR SOLUTION INTRAVENOUS at 12:01

## 2022-01-16 NOTE — ASSESSMENT & PLAN NOTE
Patient additionally has not been eating and drinking as well now for several weeks which is concerning.  We will need to closely monitor his oral intake.   1.  Restart home PPI  2.  Encourage oral intake  3.  May need a speech eval for his swallow    1/13 eating /swallowing concerns much improved. He is taking diet and fluids and snacks.    01/14/22: BUN/CR 3/0.7, eating and drinking has improved.Tolerating diet and snacks.  -encourage po fluids  -Continue to monitor BMP    01/15/22: BUN/CR 3/0.81. Continues to eat and drink well.    01/16/22: BUN/CR 2/0.74

## 2022-01-16 NOTE — ASSESSMENT & PLAN NOTE
Patient has low potassium but at this time, appears fairly asymptomatic from this.    1.  Potassium chloride 40 mEq three times daily  2.  Recheck BMP in the AM  3.  Fluids also have potassium     1/12 K+ is 2.4 this AM.  Will increase IV K+ and start oral magnesium  1/13 K+ 3.4 adjusting K+ therapy    01/14/22 Potassium 4.2 today  -Continue potassium chl 20 meq BID  -Continue to monitor BMP    01/15/22: K+ 4.2  -BMP in am    01/16/22: K+ 4.3

## 2022-01-16 NOTE — SUBJECTIVE & OBJECTIVE
Interval History: improving    Review of Systems   Constitutional: Negative.  Negative for chills, fatigue and fever.   HENT: Negative.  Negative for ear pain, sinus pain and trouble swallowing.    Eyes: Negative.  Negative for pain and visual disturbance.   Respiratory: Negative for cough, shortness of breath and wheezing.    Cardiovascular: Negative.    Gastrointestinal: Negative.  Negative for abdominal pain, diarrhea, nausea and vomiting.   Genitourinary: Negative.  Negative for dysuria and frequency.   Musculoskeletal: Negative.    Skin: Negative.  Negative for rash.   Neurological: Negative for dizziness, weakness, light-headedness and headaches.        Generalized weakness   Hematological: Negative.    Psychiatric/Behavioral: Negative.      Objective:     Vital Signs (Most Recent):  Temp: 98.9 °F (37.2 °C) (01/16/22 0318)  Pulse: 88 (01/16/22 0318)  Resp: 20 (01/16/22 0318)  BP: 137/74 (01/16/22 0318)  SpO2: 97 % (01/16/22 0318) Vital Signs (24h Range):  Temp:  [98.2 °F (36.8 °C)-99.6 °F (37.6 °C)] 98.9 °F (37.2 °C)  Pulse:  [] 88  Resp:  [16-21] 20  SpO2:  [93 %-100 %] 97 %  BP: (108-137)/(57-74) 137/74     Weight: 50 kg (110 lb 3.7 oz)  Body mass index is 18.34 kg/m².    Intake/Output Summary (Last 24 hours) at 1/16/2022 0723  Last data filed at 1/16/2022 0600  Gross per 24 hour   Intake 1655 ml   Output 1350 ml   Net 305 ml      Physical Exam  Vitals and nursing note reviewed.   Constitutional:       General: He is awake.      Appearance: Normal appearance. He is well-developed. He is not ill-appearing, toxic-appearing or diaphoretic.   HENT:      Head: Normocephalic.      Right Ear: Hearing and external ear normal.      Left Ear: Hearing and external ear normal.      Nose: Nose normal.      Mouth/Throat:      Lips: Pink.      Mouth: Mucous membranes are moist.   Eyes:      General: Lids are normal.      Conjunctiva/sclera: Conjunctivae normal.      Pupils: Pupils are equal, round, and reactive to  light.   Cardiovascular:      Rate and Rhythm: Normal rate and regular rhythm.      Pulses:           Radial pulses are 2+ on the right side and 2+ on the left side.        Posterior tibial pulses are 2+ on the right side.      Comments: Left BKA  Pulmonary:      Effort: Pulmonary effort is normal. No respiratory distress.      Breath sounds: Normal breath sounds and air entry. No wheezing (minimal) or rhonchi.   Abdominal:      General: Abdomen is flat. Bowel sounds are normal.      Palpations: Abdomen is soft.      Tenderness: There is no abdominal tenderness.   Musculoskeletal:         General: Normal range of motion.   Lymphadenopathy:      Cervical: No cervical adenopathy.   Skin:     General: Skin is warm.      Capillary Refill: Capillary refill takes 2 to 3 seconds.      Findings: No rash.   Neurological:      General: No focal deficit present.      Mental Status: He is alert and oriented to person, place, and time. Mental status is at baseline.   Psychiatric:         Attention and Perception: Attention normal.         Mood and Affect: Mood normal.         Speech: Speech normal.         Behavior: Behavior is cooperative.         Thought Content: Thought content normal.         Judgment: Judgment normal.         Significant Labs:   All pertinent labs within the past 24 hours have been reviewed.  BMP:   Recent Labs   Lab 01/16/22  0642   GLU 97      K 4.3      CO2 29   BUN 2*   CREATININE 0.74   CALCIUM 8.0*     CBC:   Recent Labs   Lab 01/15/22  0540 01/16/22  0642   WBC 5.54 4.28*   HGB 9.3* 9.6*   HCT 28.3* 28.6*    235       Significant Imaging: No new imaging.

## 2022-01-16 NOTE — PROGRESS NOTES
[facility] Medical Surgical Unit  [facility] Medicine  Progress Note    Patient Name: Luis Manuel Mustafa  MRN: [id number]  Patient Class: IP- Inpatient   Admission Date: 1/11/2022  Length of Stay: 5 days  Attending Physician: Danis Jackson MD  Primary Care Provider: Farhana Olivier MD        Subjective:     Principal Problem:Pneumonia of right lower lobe due to infectious organism        HPI:  68 year old male with history of peripheral vascular disease and hypertension with a pacemaker   in place who presents for a number of complaints including shortness of breath, nausea and   poor appetite, and back pain. For his cough, patient states that he has had a cough for 2   weeks which is worsening. Patient has associated shortness of breath. No fevers. Patient   has no underlying pulmonary problems.     For his nausea, patient states that he has tightness in the abdomen. Patient is having difficulty   swallowing and states he hasn't been able to eat in several weeks. He additionally complains   of a headache in the frontal sinus region now for 2 weeks and chills. He states that he had one   loose stool two days ago.     For his back pain, patient states that he has been sleeping on the couch and attributes his back   pain to this.       Overview/Hospital Course:  1/12  He seems much better this AM. Has taken off O2. Sleeping well.             He has cc/o sever weight loss. He claims he has no dysphagia which contradicts his history with Dr Hernandez.     1/13  Day # IV Zithromax/Rocephin. He is swallowing today. Eating today. K+ better    01/14/22  Currently receiving IV Rocephin and Azithromycin for treatment of pneumonia. Receiving duo nebs every 6 hours. O2 sat 98% on O2 at 2L/NC. Has coarse breath sounds noted in RLL only. Reports that he is eating and drinking better. BUN/CR 3/0.7, K+ 4.2. Patient denies any new complaints today, no problems reported by nursing staff today.    01/15/22: Patient on day 4 of 5 for IV Rocephin and  Azithromycin. Continues to eat and drink well. Has O2 off at present. Reports feeling better. Lungs clear to auscultation. O2 sat 98% this am. BUN/CR 3/0.81, K+ 4.2. Preliminary BC from 01/11 no growth. Denies any new complaints today, no problems reported by staff.     01/16/22: Day 5 of 5 of IV Rocephin and Azithromycin for treatment of pneumonia. Patient sitting up in bed, reports feeling much better. O2 is off and sat is 97% on room air. WBC 4.28, K+ 4.3, BUN/CR 2/0.74. Patient denies any complaints today.      Interval History: improving    Review of Systems   Constitutional: Negative.  Negative for chills, fatigue and fever.   HENT: Negative.  Negative for ear pain, sinus pain and trouble swallowing.    Eyes: Negative.  Negative for pain and visual disturbance.   Respiratory: Negative for cough, shortness of breath and wheezing.    Cardiovascular: Negative.    Gastrointestinal: Negative.  Negative for abdominal pain, diarrhea, nausea and vomiting.   Genitourinary: Negative.  Negative for dysuria and frequency.   Musculoskeletal: Negative.    Skin: Negative.  Negative for rash.   Neurological: Negative for dizziness, weakness, light-headedness and headaches.        Generalized weakness   Hematological: Negative.    Psychiatric/Behavioral: Negative.      Objective:     Vital Signs (Most Recent):  Temp: 98.9 °F (37.2 °C) (01/16/22 0318)  Pulse: 88 (01/16/22 0318)  Resp: 20 (01/16/22 0318)  BP: 137/74 (01/16/22 0318)  SpO2: 97 % (01/16/22 0318) Vital Signs (24h Range):  Temp:  [98.2 °F (36.8 °C)-99.6 °F (37.6 °C)] 98.9 °F (37.2 °C)  Pulse:  [] 88  Resp:  [16-21] 20  SpO2:  [93 %-100 %] 97 %  BP: (108-137)/(57-74) 137/74     Weight: 50 kg (110 lb 3.7 oz)  Body mass index is 18.34 kg/m².    Intake/Output Summary (Last 24 hours) at 1/16/2022 0723  Last data filed at 1/16/2022 0600  Gross per 24 hour   Intake 1655 ml   Output 1350 ml   Net 305 ml      Physical Exam  Vitals and nursing note reviewed.    Constitutional:       General: He is awake.      Appearance: Normal appearance. He is well-developed. He is not ill-appearing, toxic-appearing or diaphoretic.   HENT:      Head: Normocephalic.      Right Ear: Hearing and external ear normal.      Left Ear: Hearing and external ear normal.      Nose: Nose normal.      Mouth/Throat:      Lips: Pink.      Mouth: Mucous membranes are moist.   Eyes:      General: Lids are normal.      Conjunctiva/sclera: Conjunctivae normal.      Pupils: Pupils are equal, round, and reactive to light.   Cardiovascular:      Rate and Rhythm: Normal rate and regular rhythm.      Pulses:           Radial pulses are 2+ on the right side and 2+ on the left side.        Posterior tibial pulses are 2+ on the right side.      Comments: Left BKA  Pulmonary:      Effort: Pulmonary effort is normal. No respiratory distress.      Breath sounds: Normal breath sounds and air entry. No wheezing (minimal) or rhonchi.   Abdominal:      General: Abdomen is flat. Bowel sounds are normal.      Palpations: Abdomen is soft.      Tenderness: There is no abdominal tenderness.   Musculoskeletal:         General: Normal range of motion.   Lymphadenopathy:      Cervical: No cervical adenopathy.   Skin:     General: Skin is warm.      Capillary Refill: Capillary refill takes 2 to 3 seconds.      Findings: No rash.   Neurological:      General: No focal deficit present.      Mental Status: He is alert and oriented to person, place, and time. Mental status is at baseline.   Psychiatric:         Attention and Perception: Attention normal.         Mood and Affect: Mood normal.         Speech: Speech normal.         Behavior: Behavior is cooperative.         Thought Content: Thought content normal.         Judgment: Judgment normal.         Significant Labs:   All pertinent labs within the past 24 hours have been reviewed.  BMP:   Recent Labs   Lab 01/16/22  0642   GLU 97      K 4.3      CO2 29   BUN 2*    CREATININE 0.74   CALCIUM 8.0*     CBC:   Recent Labs   Lab 01/15/22  0540 01/16/22  0642   WBC 5.54 4.28*   HGB 9.3* 9.6*   HCT 28.3* 28.6*    235       Significant Imaging: No new imaging.      Assessment/Plan:      * Pneumonia of right lower lobe due to infectious organism  68 year old male who presents with cough and hypoxia with CXR demonstrating a right lower lobe pneumonia.  COVID testing is negative.    1.  Rocephin 2 gram IV daily   2.  Tylenol and motrin as needed for fevers    Day #2 IV Rocephin and Zithromax    1/13  CT Chest shows infiltrate. No Tumor identified. Needs F/U CT in a few weeks    01/14/22: Coarse breath sounds RLL only, O2 sat 98% on O2 at 2L/NC.   -Continue Rocephin IV 2 gm daily (day 3 of 5)   Continue Azithromycin  mg daily (day 3 of 5)  -Continue Duo nebs every 6 hours    01/15/22: Lungs clear to auscultation, O2 sat 98% on room air.   -Continue Rocephin IV 2 gm daily (day 4 of 5)   Continue Azithromycin  mg daily (day 4 of 5)    01/16/22: Lungs clear. O2 sat 97% on room air  -Rocephin 2 gm IV every day (Day of 5 of 5)  -Azithromycin 500 mg IV every day (Day 5 of 5)    Weight loss, unintentional  1/13 CT abdomen and also CT Chest for suspected Lung Mass and weight loss essentially negative for Cancer diagnosis    Will as for GI referral at discharge      Nausea  Patient complains of ongoing nausea and suprapubic abdominal pain and also nausea which is preventing him from eating.  His urinalysis is unremarkable.  May need to continue monitoring intake closely.     1/13 resolved  01/14: No nausea, vomiting or abdominal pain. Eating and drinking has improved.    Hypokalemia  Patient has low potassium but at this time, appears fairly asymptomatic from this.    1.  Potassium chloride 40 mEq three times daily  2.  Recheck BMP in the AM  3.  Fluids also have potassium     1/12 K+ is 2.4 this AM.  Will increase IV K+ and start oral magnesium  1/13 K+ 3.4 adjusting K+  therapy    01/14/22 Potassium 4.2 today  -Continue potassium chl 20 meq BID  -Continue to monitor BMP    01/15/22: K+ 4.2  -BMP in am    01/16/22: K+ 4.3    Dehydration    Patient additionally has not been eating and drinking as well now for several weeks which is concerning.  We will need to closely monitor his oral intake.   1.  Restart home PPI  2.  Encourage oral intake  3.  May need a speech eval for his swallow    1/13 eating /swallowing concerns much improved. He is taking diet and fluids and snacks.    01/14/22: BUN/CR 3/0.7, eating and drinking has improved.Tolerating diet and snacks.  -encourage po fluids  -Continue to monitor BMP    01/15/22: BUN/CR 3/0.81. Continues to eat and drink well.    01/16/22: BUN/CR 2/0.74      VTE Risk Mitigation (From admission, onward)         Ordered     enoxaparin injection 40 mg  Daily         01/11/22 2590                Discharge Planning   CHANCE:      Code Status: Full Code   Is the patient medically ready for discharge?:     Reason for patient still in hospital (select all that apply): Treatment  Discharge Plan A: Home,Home Health          Discussed patient's case, labs and medications with Dr. Sarkar. He agreed to no change in current POC.       Em Benz, STAN[place]ORD  Department of Hospital Medicine   [place] - Medical Surgical Unit

## 2022-01-16 NOTE — ASSESSMENT & PLAN NOTE
68 year old male who presents with cough and hypoxia with CXR demonstrating a right lower lobe pneumonia.  COVID testing is negative.    1.  Rocephin 2 gram IV daily   2.  Tylenol and motrin as needed for fevers    Day #2 IV Rocephin and Zithromax    1/13  CT Chest shows infiltrate. No Tumor identified. Needs F/U CT in a few weeks    01/14/22: Coarse breath sounds RLL only, O2 sat 98% on O2 at 2L/NC.   -Continue Rocephin IV 2 gm daily (day 3 of 5)   Continue Azithromycin  mg daily (day 3 of 5)  -Continue Duo nebs every 6 hours    01/15/22: Lungs clear to auscultation, O2 sat 98% on room air.   -Continue Rocephin IV 2 gm daily (day 4 of 5)   Continue Azithromycin  mg daily (day 4 of 5)    01/16/22: Lungs clear. O2 sat 97% on room air  -Rocephin 2 gm IV every day (Day of 5 of 5)  -Azithromycin 500 mg IV every day (Day 5 of 5)

## 2022-01-16 NOTE — RESPIRATORY THERAPY
FOLLOW UP ASSESSMENT:      Patient is awake and alert. His o2 sat. Is 95% on 2L but drops into mid to ow 80's when he takes it off.  His skin is warm and dry and he has clear bilateral breath sounds.     Vitals:  HR 94-94, RR 19, o2 sat. 92-95% on 2L.  /76    Labs:  1/16/22 WBC low at 4.28, RBC low at 2.73, H&H low at 9>6 & 28.6  Co2 normal and glucose 97    Xray:  Lower rt lobe lobular pneumonia involving several of the bronchi of rt lower lobe.

## 2022-01-17 VITALS
OXYGEN SATURATION: 99 % | HEART RATE: 83 BPM | DIASTOLIC BLOOD PRESSURE: 71 MMHG | WEIGHT: 110.25 LBS | SYSTOLIC BLOOD PRESSURE: 138 MMHG | BODY MASS INDEX: 18.37 KG/M2 | HEIGHT: 65 IN | RESPIRATION RATE: 18 BRPM | TEMPERATURE: 98 F

## 2022-01-17 PROBLEM — E87.6 HYPOKALEMIA: Status: RESOLVED | Noted: 2022-01-11 | Resolved: 2022-01-17

## 2022-01-17 PROBLEM — E86.0 DEHYDRATION: Status: RESOLVED | Noted: 2022-01-11 | Resolved: 2022-01-17

## 2022-01-17 PROBLEM — R11.0 NAUSEA: Status: RESOLVED | Noted: 2022-01-11 | Resolved: 2022-01-17

## 2022-01-17 LAB
ANION GAP SERPL CALCULATED.3IONS-SCNC: 12 MMOL/L (ref 7–16)
BACTERIA BLD CULT: NORMAL
BACTERIA BLD CULT: NORMAL
BASOPHILS # BLD AUTO: 0.01 K/UL (ref 0–0.2)
BASOPHILS NFR BLD AUTO: 0.2 % (ref 0–1)
BUN SERPL-MCNC: 4 MG/DL (ref 7–18)
BUN/CREAT SERPL: 5 (ref 6–20)
CALCIUM SERPL-MCNC: 8.5 MG/DL (ref 8.5–10.1)
CHLORIDE SERPL-SCNC: 102 MMOL/L (ref 98–107)
CO2 SERPL-SCNC: 29 MMOL/L (ref 21–32)
CREAT SERPL-MCNC: 0.76 MG/DL (ref 0.7–1.3)
DIFFERENTIAL METHOD BLD: ABNORMAL
EOSINOPHIL # BLD AUTO: 0.11 K/UL (ref 0–0.5)
EOSINOPHIL NFR BLD AUTO: 2.6 % (ref 1–4)
EOSINOPHIL NFR BLD MANUAL: 1 % (ref 1–4)
ERYTHROCYTE [DISTWIDTH] IN BLOOD BY AUTOMATED COUNT: 12.8 % (ref 11.5–14.5)
GLUCOSE SERPL-MCNC: 106 MG/DL (ref 74–106)
HCT VFR BLD AUTO: 27.9 % (ref 40–54)
HGB BLD-MCNC: 9.2 G/DL (ref 13.5–18)
LYMPHOCYTES # BLD AUTO: 1.43 K/UL (ref 1–4.8)
LYMPHOCYTES NFR BLD AUTO: 33.3 % (ref 27–41)
LYMPHOCYTES NFR BLD MANUAL: 30 % (ref 27–41)
MCH RBC QN AUTO: 35 PG (ref 27–31)
MCHC RBC AUTO-ENTMCNC: 33 G/DL (ref 32–36)
MCV RBC AUTO: 106.1 FL (ref 80–96)
MONOCYTES # BLD AUTO: 0.68 K/UL (ref 0–0.8)
MONOCYTES NFR BLD AUTO: 15.8 % (ref 2–6)
MONOCYTES NFR BLD MANUAL: 12 % (ref 2–6)
MPC BLD CALC-MCNC: 10.1 FL (ref 9.4–12.4)
NEUTROPHILS # BLD AUTO: 2.07 K/UL (ref 1.8–7.7)
NEUTROPHILS NFR BLD AUTO: 48.1 % (ref 53–65)
NEUTS SEG NFR BLD MANUAL: 57 % (ref 50–62)
NRBC BLD MANUAL-RTO: ABNORMAL %
PLATELET # BLD AUTO: 225 K/UL (ref 150–400)
POTASSIUM SERPL-SCNC: 4.5 MMOL/L (ref 3.5–5.1)
RBC # BLD AUTO: 2.63 M/UL (ref 4.6–6.2)
SODIUM SERPL-SCNC: 138 MMOL/L (ref 136–145)
WBC # BLD AUTO: 4.3 K/UL (ref 4.5–11)

## 2022-01-17 PROCEDURE — 85025 COMPLETE CBC W/AUTO DIFF WBC: CPT | Performed by: EMERGENCY MEDICINE

## 2022-01-17 PROCEDURE — 94640 AIRWAY INHALATION TREATMENT: CPT

## 2022-01-17 PROCEDURE — 80048 BASIC METABOLIC PNL TOTAL CA: CPT | Performed by: EMERGENCY MEDICINE

## 2022-01-17 PROCEDURE — 99238 PR HOSPITAL DISCHARGE DAY,<30 MIN: ICD-10-PCS | Mod: ,,, | Performed by: EMERGENCY MEDICINE

## 2022-01-17 PROCEDURE — 25000003 PHARM REV CODE 250: Performed by: PEDIATRICS

## 2022-01-17 PROCEDURE — 94760 N-INVAS EAR/PLS OXIMETRY 1: CPT

## 2022-01-17 PROCEDURE — 99238 HOSP IP/OBS DSCHRG MGMT 30/<: CPT | Mod: ,,, | Performed by: EMERGENCY MEDICINE

## 2022-01-17 PROCEDURE — 25000003 PHARM REV CODE 250: Performed by: EMERGENCY MEDICINE

## 2022-01-17 PROCEDURE — 25000242 PHARM REV CODE 250 ALT 637 W/ HCPCS

## 2022-01-17 PROCEDURE — 36415 COLL VENOUS BLD VENIPUNCTURE: CPT | Performed by: EMERGENCY MEDICINE

## 2022-01-17 PROCEDURE — 94761 N-INVAS EAR/PLS OXIMETRY MLT: CPT

## 2022-01-17 PROCEDURE — 27000221 HC OXYGEN, UP TO 24 HOURS

## 2022-01-17 RX ORDER — IPRATROPIUM BROMIDE AND ALBUTEROL SULFATE 2.5; .5 MG/3ML; MG/3ML
3 SOLUTION RESPIRATORY (INHALATION) EVERY 6 HOURS
Qty: 75 ML | Refills: 0 | Status: SHIPPED | OUTPATIENT
Start: 2022-01-17 | End: 2023-01-17

## 2022-01-17 RX ADMIN — CLOPIDOGREL 75 MG: 75 TABLET, FILM COATED ORAL at 08:01

## 2022-01-17 RX ADMIN — POTASSIUM CHLORIDE 20 MEQ: 1500 TABLET, EXTENDED RELEASE ORAL at 08:01

## 2022-01-17 RX ADMIN — PANTOPRAZOLE SODIUM 40 MG: 40 TABLET, DELAYED RELEASE ORAL at 08:01

## 2022-01-17 RX ADMIN — MAGNESIUM 64 MG (MAGNESIUM CHLORIDE) TABLET,DELAYED RELEASE 64 MG: at 08:01

## 2022-01-17 RX ADMIN — IPRATROPIUM BROMIDE AND ALBUTEROL SULFATE 3 ML: 2.5; .5 SOLUTION RESPIRATORY (INHALATION) at 07:01

## 2022-01-17 RX ADMIN — LISINOPRIL 2.5 MG: 2.5 TABLET ORAL at 08:01

## 2022-01-17 RX ADMIN — IPRATROPIUM BROMIDE AND ALBUTEROL SULFATE 3 ML: 2.5; .5 SOLUTION RESPIRATORY (INHALATION) at 12:01

## 2022-01-17 NOTE — ASSESSMENT & PLAN NOTE
68 year old male who presents with cough and hypoxia with CXR demonstrating a right lower lobe pneumonia.  COVID testing is negative.    1.  Rocephin 2 gram IV daily   2.  Tylenol and motrin as needed for fevers    Day #2 IV Rocephin and Zithromax    1/13  CT Chest shows infiltrate. No Tumor identified. Needs F/U CT in a few weeks    01/14/22: Coarse breath sounds RLL only, O2 sat 98% on O2 at 2L/NC.   -Continue Rocephin IV 2 gm daily (day 3 of 5)   Continue Azithromycin  mg daily (day 3 of 5)  -Continue Duo nebs every 6 hours    01/15/22: Lungs clear to auscultation, O2 sat 98% on room air.   -Continue Rocephin IV 2 gm daily (day 4 of 5)   Continue Azithromycin  mg daily (day 4 of 5)    01/16/22: Lungs clear. O2 sat 97% on room air  -Rocephin 2 gm IV every day (Day of 5 of 5)  -Azithromycin 500 mg IV every day (Day 5 of 5)    1/17  He need CT Scan Chest in 4 weeks.

## 2022-01-17 NOTE — NURSING
Pt discharge to home via private car with family. D/C instructions given to patient and belongings given to family.

## 2022-01-17 NOTE — PLAN OF CARE
Problem: Adult Inpatient Plan of Care  Goal: Plan of Care Review  Outcome: Met  Goal: Patient-Specific Goal (Individualized)  Outcome: Met  Goal: Absence of Hospital-Acquired Illness or Injury  Outcome: Met  Goal: Optimal Comfort and Wellbeing  Outcome: Met  Goal: Readiness for Transition of Care  Outcome: Met     Problem: Fluid Imbalance (Pneumonia)  Goal: Fluid Balance  Outcome: Met     Problem: Infection (Pneumonia)  Goal: Resolution of Infection Signs and Symptoms  Outcome: Met     Problem: Respiratory Compromise (Pneumonia)  Goal: Effective Oxygenation and Ventilation  Outcome: Met     Problem: Skin Injury Risk Increased  Goal: Skin Health and Integrity  Outcome: Met     Problem: Fall Injury Risk  Goal: Absence of Fall and Fall-Related Injury  Outcome: Met

## 2022-01-17 NOTE — PLAN OF CARE
Problem: Adult Inpatient Plan of Care  Goal: Plan of Care Review  Outcome: Ongoing, Progressing  Goal: Patient-Specific Goal (Individualized)  Outcome: Ongoing, Progressing  Goal: Absence of Hospital-Acquired Illness or Injury  Outcome: Ongoing, Progressing  Goal: Optimal Comfort and Wellbeing  Outcome: Ongoing, Progressing  Goal: Readiness for Transition of Care  Outcome: Ongoing, Progressing     Problem: Fluid Imbalance (Pneumonia)  Goal: Fluid Balance  Outcome: Ongoing, Progressing     Problem: Infection (Pneumonia)  Goal: Resolution of Infection Signs and Symptoms  Outcome: Ongoing, Progressing     Problem: Respiratory Compromise (Pneumonia)  Goal: Effective Oxygenation and Ventilation  Outcome: Ongoing, Progressing     Problem: Skin Injury Risk Increased  Goal: Skin Health and Integrity  Outcome: Ongoing, Progressing     Problem: Fall Injury Risk  Goal: Absence of Fall and Fall-Related Injury  Outcome: Ongoing, Progressing

## 2022-01-17 NOTE — DISCHARGE SUMMARY
[facility] Medical Surgical Unit  Hospital Medicine  Discharge Summary      Patient Name: Luis Manuel Mustafa  MRN: [id number]  Patient Class: IP- Inpatient  Admission Date: 1/11/2022  Hospital Length of Stay: 6 days  Discharge Date and Time:  01/17/2022 7:30 AM  Attending Physician: Danis Jackson MD   Discharging Provider: Danis Jackson MD  Primary Care Provider: Farhana Olivier MD      HPI:   68 year old male with history of peripheral vascular disease and hypertension with a pacemaker   in place who presents for a number of complaints including shortness of breath, nausea and   poor appetite, and back pain. For his cough, patient states that he has had a cough for 2   weeks which is worsening. Patient has associated shortness of breath. No fevers. Patient   has no underlying pulmonary problems.     For his nausea, patient states that he has tightness in the abdomen. Patient is having difficulty   swallowing and states he hasn't been able to eat in several weeks. He additionally complains   of a headache in the frontal sinus region now for 2 weeks and chills. He states that he had one   loose stool two days ago.     For his back pain, patient states that he has been sleeping on the couch and attributes his back   pain to this.       * No surgery found *      Hospital Course:   1/12  He seems much better this AM. Has taken off O2. Sleeping well.             He has cc/o sever weight loss. He claims he has no dysphagia which contradicts his history with Dr Hernandez.     1/13  Day # IV Zithromax/Rocephin. He is swallowing today. Eating today. K+ better    01/14/22  Currently receiving IV Rocephin and Azithromycin for treatment of pneumonia. Receiving duo nebs every 6 hours. O2 sat 98% on O2 at 2L/NC. Has coarse breath sounds noted in RLL only. Reports that he is eating and drinking better. BUN/CR 3/0.7, K+ 4.2. Patient denies any new complaints today, no problems reported by nursing staff today.    01/15/22: Patient on day 4  of 5 for IV Rocephin and Azithromycin. Continues to eat and drink well. Has O2 off at present. Reports feeling better. Lungs clear to auscultation. O2 sat 98% this am. BUN/CR 3/0.81, K+ 4.2. Preliminary BC from 01/11 no growth. Denies any new complaints today, no problems reported by staff.     01/16/22: Day 5 of 5 of IV Rocephin and Azithromycin for treatment of pneumonia. Patient sitting up in bed, reports feeling much better. O2 is off and sat is 97% on room air. WBC 4.28, K+ 4.3, BUN/CR 2/0.74. Patient denies any complaints today.    01/17/2021  He has reached MHB .will be discharged home. Off O2.          Goals of Care Treatment Preferences:  Code Status: Full Code      Consults:   Consults (From admission, onward)        Status Ordering Provider     Inpatient consult to Registered Dietitian/Nutritionist  Once        Provider:  (Not yet assigned)    Acknowledged CLAYTON SAUNDERS          * Pneumonia of right lower lobe due to infectious organism  68 year old male who presents with cough and hypoxia with CXR demonstrating a right lower lobe pneumonia.  COVID testing is negative.    1.  Rocephin 2 gram IV daily   2.  Tylenol and motrin as needed for fevers    Day #2 IV Rocephin and Zithromax    1/13  CT Chest shows infiltrate. No Tumor identified. Needs F/U CT in a few weeks    01/14/22: Coarse breath sounds RLL only, O2 sat 98% on O2 at 2L/NC.   -Continue Rocephin IV 2 gm daily (day 3 of 5)   Continue Azithromycin  mg daily (day 3 of 5)  -Continue Duo nebs every 6 hours    01/15/22: Lungs clear to auscultation, O2 sat 98% on room air.   -Continue Rocephin IV 2 gm daily (day 4 of 5)   Continue Azithromycin  mg daily (day 4 of 5)    01/16/22: Lungs clear. O2 sat 97% on room air  -Rocephin 2 gm IV every day (Day of 5 of 5)  -Azithromycin 500 mg IV every day (Day 5 of 5)    1/17  He need CT Scan Chest in 4 weeks.       Final Active Diagnoses:    Diagnosis Date Noted POA    PRINCIPAL PROBLEM:  Pneumonia  of right lower lobe due to infectious organism [J18.9] 01/11/2022 Yes    Weight loss, unintentional [R63.4] 01/12/2022 Unknown      Problems Resolved During this Admission:    Diagnosis Date Noted Date Resolved POA    Dehydration [E86.0] 01/11/2022 01/17/2022 Unknown    Hypokalemia [E87.6] 01/11/2022 01/17/2022 Unknown    Nausea [R11.0] 01/11/2022 01/17/2022 Unknown       Discharged Condition: good    Disposition: Home or Self Care    Follow Up:   Follow-up Information     Farhana Olivier MD In 1 week.    Specialty: Family Medicine  Why: he needs CT Chest 4 to 6 weeks to rule out Lung Mass  Contact information:  [address] [place] [address] [address] [place] [facility]  [place] [address] [phone]                       Patient Instructions:      Diet Adult Regular     Notify your health care provider if you experience any of the following:  temperature >100.4     Activity as tolerated       Significant Diagnostic Studies: Labs:   BMP:   Recent Labs   Lab 01/16/22  0642 01/17/22  0440   GLU 97 106    138   K 4.3 4.5    102   CO2 29 29   BUN 2* 4*   CREATININE 0.74 0.76   CALCIUM 8.0* 8.5    and CBC   Recent Labs   Lab 01/16/22  0642 01/16/22  0642 01/17/22  0440   WBC 4.28*  --  4.30*   HGB 9.6*  --  9.2*   HCT 28.6*   < > 27.9*     --  225    < > = values in this interval not displayed.       Pending Diagnostic Studies:     None         Medications:  Reconciled Home Medications:      Medication List      START taking these medications    albuterol-ipratropium 2.5 mg-0.5 mg/3 mL nebulizer solution  Commonly known as: DUO-NEB  Take 3 mLs by nebulization every 6 (six) hours. Rescue        CONTINUE taking these medications    atorvastatin 80 MG tablet  Commonly known as: LIPITOR  Take 80 mg by mouth nightly.     clopidogreL 75 mg tablet  Commonly known as: PLAVIX  Take 75 mg by mouth once daily.     lisinopriL 2.5 MG tablet  Commonly known as: PRINIVIL,ZESTRIL  Take 2.5 mg by mouth once daily.      pantoprazole 40 MG tablet  Commonly known as: PROTONIX  Take 40 mg by mouth once daily.            Indwelling Lines/Drains at time of discharge:   Lines/Drains/Airways     None                 Time spent on the discharge of patient: 30 minutes         Danis Jackson MD  Department of Hospital Medicine  [place] - Medical Surgical Unit

## 2022-01-18 ENCOUNTER — TELEPHONE (OUTPATIENT)
Dept: FAMILY MEDICINE | Facility: CLINIC | Age: 67
End: 2022-01-18
Payer: COMMERCIAL

## 2022-01-19 NOTE — TELEPHONE ENCOUNTER
1/18/22 @6506-spoke with pt daughter. States he is doing pretty good. Denies he has complained of shortness of breath or chest pain. He does not have home health but she wants to get it set up at his followup appt. Reviewed and discussed all discharge medications. States she hasnt picked up the albuterol yet but has all other meds. States she manages them and puts them in cups for him. Informed of hospital follow up with Dr. Olivier. Instructed to bring all meds to follow up visit and to call office for questions/concerns. Also to seek medical attention for any new or worsening sx. Kerri TParkmanRN

## 2022-01-26 ENCOUNTER — OFFICE VISIT (OUTPATIENT)
Dept: FAMILY MEDICINE | Facility: CLINIC | Age: 67
End: 2022-01-26
Payer: COMMERCIAL

## 2022-01-26 VITALS
DIASTOLIC BLOOD PRESSURE: 60 MMHG | OXYGEN SATURATION: 96 % | SYSTOLIC BLOOD PRESSURE: 120 MMHG | BODY MASS INDEX: 18.33 KG/M2 | RESPIRATION RATE: 18 BRPM | TEMPERATURE: 98 F | WEIGHT: 110 LBS | HEART RATE: 97 BPM | HEIGHT: 65 IN

## 2022-01-26 DIAGNOSIS — I10 PRIMARY HYPERTENSION: ICD-10-CM

## 2022-01-26 DIAGNOSIS — S88.919A AMPUTATION OF LEG: ICD-10-CM

## 2022-01-26 DIAGNOSIS — I73.9 PVD (PERIPHERAL VASCULAR DISEASE): ICD-10-CM

## 2022-01-26 DIAGNOSIS — R53.81 DEBILITY: ICD-10-CM

## 2022-01-26 DIAGNOSIS — J18.9 PNEUMONIA OF RIGHT LOWER LOBE DUE TO INFECTIOUS ORGANISM: Primary | ICD-10-CM

## 2022-01-26 DIAGNOSIS — K21.9 GASTROESOPHAGEAL REFLUX DISEASE WITHOUT ESOPHAGITIS: ICD-10-CM

## 2022-01-26 PROCEDURE — 4010F PR ACE/ARB THEARPY RXD/TAKEN: ICD-10-PCS | Mod: ,,, | Performed by: FAMILY MEDICINE

## 2022-01-26 PROCEDURE — 3074F SYST BP LT 130 MM HG: CPT | Mod: ,,, | Performed by: FAMILY MEDICINE

## 2022-01-26 PROCEDURE — 1101F PT FALLS ASSESS-DOCD LE1/YR: CPT | Mod: ,,, | Performed by: FAMILY MEDICINE

## 2022-01-26 PROCEDURE — 3008F PR BODY MASS INDEX (BMI) DOCUMENTED: ICD-10-PCS | Mod: ,,, | Performed by: FAMILY MEDICINE

## 2022-01-26 PROCEDURE — 1101F PR PT FALLS ASSESS DOC 0-1 FALLS W/OUT INJ PAST YR: ICD-10-PCS | Mod: ,,, | Performed by: FAMILY MEDICINE

## 2022-01-26 PROCEDURE — 3074F PR MOST RECENT SYSTOLIC BLOOD PRESSURE < 130 MM HG: ICD-10-PCS | Mod: ,,, | Performed by: FAMILY MEDICINE

## 2022-01-26 PROCEDURE — 4010F ACE/ARB THERAPY RXD/TAKEN: CPT | Mod: ,,, | Performed by: FAMILY MEDICINE

## 2022-01-26 PROCEDURE — 3008F BODY MASS INDEX DOCD: CPT | Mod: ,,, | Performed by: FAMILY MEDICINE

## 2022-01-26 PROCEDURE — 1159F MED LIST DOCD IN RCRD: CPT | Mod: ,,, | Performed by: FAMILY MEDICINE

## 2022-01-26 PROCEDURE — 99495 TRANSJ CARE MGMT MOD F2F 14D: CPT | Mod: ,,, | Performed by: FAMILY MEDICINE

## 2022-01-26 PROCEDURE — 3288F FALL RISK ASSESSMENT DOCD: CPT | Mod: ,,, | Performed by: FAMILY MEDICINE

## 2022-01-26 PROCEDURE — 3078F PR MOST RECENT DIASTOLIC BLOOD PRESSURE < 80 MM HG: ICD-10-PCS | Mod: ,,, | Performed by: FAMILY MEDICINE

## 2022-01-26 PROCEDURE — 1160F PR REVIEW ALL MEDS BY PRESCRIBER/CLIN PHARMACIST DOCUMENTED: ICD-10-PCS | Mod: ,,, | Performed by: FAMILY MEDICINE

## 2022-01-26 PROCEDURE — 99495 TCM SERVICES (MODERATE COMPLEXITY): ICD-10-PCS | Mod: ,,, | Performed by: FAMILY MEDICINE

## 2022-01-26 PROCEDURE — 1160F RVW MEDS BY RX/DR IN RCRD: CPT | Mod: ,,, | Performed by: FAMILY MEDICINE

## 2022-01-26 PROCEDURE — 1159F PR MEDICATION LIST DOCUMENTED IN MEDICAL RECORD: ICD-10-PCS | Mod: ,,, | Performed by: FAMILY MEDICINE

## 2022-01-26 PROCEDURE — 3288F PR FALLS RISK ASSESSMENT DOCUMENTED: ICD-10-PCS | Mod: ,,, | Performed by: FAMILY MEDICINE

## 2022-01-26 PROCEDURE — 3078F DIAST BP <80 MM HG: CPT | Mod: ,,, | Performed by: FAMILY MEDICINE

## 2022-01-26 RX ORDER — CLOPIDOGREL BISULFATE 75 MG/1
75 TABLET ORAL DAILY
Qty: 90 TABLET | Refills: 1 | Status: SHIPPED | OUTPATIENT
Start: 2022-01-26 | End: 2022-07-25

## 2022-01-26 RX ORDER — ATORVASTATIN CALCIUM 80 MG/1
80 TABLET, FILM COATED ORAL NIGHTLY
Qty: 90 TABLET | Refills: 1 | Status: SHIPPED | OUTPATIENT
Start: 2022-01-26 | End: 2022-07-25

## 2022-01-26 RX ORDER — PANTOPRAZOLE SODIUM 40 MG/1
40 TABLET, DELAYED RELEASE ORAL DAILY
Qty: 90 TABLET | Refills: 1 | Status: SHIPPED | OUTPATIENT
Start: 2022-01-26 | End: 2022-07-25

## 2022-01-26 RX ORDER — LISINOPRIL 2.5 MG/1
2.5 TABLET ORAL DAILY
Qty: 90 TABLET | Refills: 1 | Status: SHIPPED | OUTPATIENT
Start: 2022-01-26 | End: 2022-07-25

## 2022-01-26 NOTE — PROGRESS NOTES
Farhana Olivier MD        PATIENT NAME: Luis Manuel Mustafa  : 1953  DATE: 22  MRN: [id number]      Billing Provider: Farhana Olivier MD  Level of Service:   Patient PCP Information     Provider PCP Type    Farhana Olivier MD General          Reason for Visit / Chief Complaint: Transitional Care ([facility])       History of Present Illness      Luis Manuel Mustafa presents to the clinic with Transitional Care ([facility])     This is transition care  Medications were all reconcile and gone over with the pt, bottles labeled    He was in the hospital due to pneumonia, discharged 22    He has a nebulizing machine coming to his home, ordered two days ago    He is feeling much better, able to breath with no problems, he states he feels back to his normal self      Review of Systems     Review of Systems   Constitutional: Negative for activity change, appetite change, fatigue and fever.   HENT: Negative for postnasal drip and rhinorrhea.    Respiratory: Negative for apnea, cough, choking, chest tightness and shortness of breath.    Musculoskeletal: Positive for arthralgias and gait problem.   Allergic/Immunologic: Positive for environmental allergies.   Psychiatric/Behavioral: Negative for agitation, behavioral problems and suicidal ideas.       Medical / Social / Family History     Past Medical History:   Diagnosis Date    Below knee amputation     High cholesterol     Hypertension     Pacemaker        Past Surgical History:   Procedure Laterality Date    BACK SURGERY      BKA Left     CARDIAC SURGERY      PACEMAKER, IVCF       Social History    reports that he has quit smoking. His smokeless tobacco use includes snuff. He reports current alcohol use. He reports that he does not use drugs.    Family History  's family history is not on file.    Medications and Allergies     Medications  No outpatient medications have been marked as taking for the 22 encounter (Office Visit) with Farhana  "MD Soy.       Allergies  Review of patient's allergies indicates:  No Known Allergies    Physical Examination   /60 (BP Location: Left arm, Patient Position: Sitting, BP Method: Medium (Automatic))   Pulse 97   Temp 97.7 °F (36.5 °C) (Temporal)   Resp 18   Ht 5' 5" (1.651 m)   Wt 49.9 kg (110 lb)   SpO2 96%   BMI 18.30 kg/m²     Physical Exam  Constitutional:       Appearance: Normal appearance. He is normal weight.   Cardiovascular:      Rate and Rhythm: Normal rate and regular rhythm.   Pulmonary:      Effort: Pulmonary effort is normal.      Breath sounds: Normal breath sounds.   Musculoskeletal:      Comments: Amputation below the knee left lower extremity   Neurological:      Mental Status: He is alert.   Psychiatric:         Mood and Affect: Mood normal.         Behavior: Behavior normal.         Assessment and Plan (including Health Maintenance)     Plan:         Problem List Items Addressed This Visit        Pulmonary    Pneumonia of right lower lobe due to infectious organism - Primary      Other Visit Diagnoses     Amputation of leg        PVD (peripheral vascular disease)        Relevant Medications    clopidogreL (PLAVIX) 75 mg tablet    atorvastatin (LIPITOR) 80 MG tablet    Debility        Primary hypertension        Relevant Medications    lisinopriL (PRINIVIL,ZESTRIL) 2.5 MG tablet    Gastroesophageal reflux disease without esophagitis        Relevant Medications    pantoprazole (PROTONIX) 40 MG tablet        He is doing much better, he will call us back if there is any problems with his nebulizing machine.    Follow up in about 3 months (around 4/26/2022).        Signature:  Farhana Olivier MD      Date of encounter: 1/26/22      "

## 2022-02-28 ENCOUNTER — OFFICE VISIT (OUTPATIENT)
Dept: FAMILY MEDICINE | Facility: CLINIC | Age: 67
End: 2022-02-28
Payer: COMMERCIAL

## 2022-02-28 VITALS
DIASTOLIC BLOOD PRESSURE: 72 MMHG | HEART RATE: 73 BPM | OXYGEN SATURATION: 97 % | RESPIRATION RATE: 20 BRPM | WEIGHT: 110 LBS | HEIGHT: 65 IN | SYSTOLIC BLOOD PRESSURE: 132 MMHG | TEMPERATURE: 97 F | BODY MASS INDEX: 18.33 KG/M2

## 2022-02-28 DIAGNOSIS — Z12.5 ENCOUNTER FOR SPECIAL SCREENING EXAMINATION FOR NEOPLASM OF PROSTATE: ICD-10-CM

## 2022-02-28 DIAGNOSIS — Z11.59 ENCOUNTER FOR HEPATITIS C SCREENING TEST FOR LOW RISK PATIENT: ICD-10-CM

## 2022-02-28 DIAGNOSIS — Z12.5 ENCOUNTER FOR SCREENING FOR MALIGNANT NEOPLASM OF PROSTATE: ICD-10-CM

## 2022-02-28 DIAGNOSIS — Z89.512 LEFT BELOW-KNEE AMPUTEE: ICD-10-CM

## 2022-02-28 DIAGNOSIS — Z12.11 ENCOUNTER FOR SCREENING FOR MALIGNANT NEOPLASM OF COLON: ICD-10-CM

## 2022-02-28 DIAGNOSIS — I10 ESSENTIAL HYPERTENSION: ICD-10-CM

## 2022-02-28 DIAGNOSIS — Z00.00 ENCOUNTER FOR INITIAL ANNUAL WELLNESS VISIT (AWV) IN MEDICARE PATIENT: Primary | ICD-10-CM

## 2022-02-28 PROCEDURE — 1126F AMNT PAIN NOTED NONE PRSNT: CPT | Mod: ,,, | Performed by: FAMILY MEDICINE

## 2022-02-28 PROCEDURE — 3075F SYST BP GE 130 - 139MM HG: CPT | Mod: ,,, | Performed by: FAMILY MEDICINE

## 2022-02-28 PROCEDURE — 1160F RVW MEDS BY RX/DR IN RCRD: CPT | Mod: ,,, | Performed by: FAMILY MEDICINE

## 2022-02-28 PROCEDURE — 4010F PR ACE/ARB THEARPY RXD/TAKEN: ICD-10-PCS | Mod: ,,, | Performed by: FAMILY MEDICINE

## 2022-02-28 PROCEDURE — 3078F DIAST BP <80 MM HG: CPT | Mod: ,,, | Performed by: FAMILY MEDICINE

## 2022-02-28 PROCEDURE — 3075F PR MOST RECENT SYSTOLIC BLOOD PRESS GE 130-139MM HG: ICD-10-PCS | Mod: ,,, | Performed by: FAMILY MEDICINE

## 2022-02-28 PROCEDURE — 3288F FALL RISK ASSESSMENT DOCD: CPT | Mod: ,,, | Performed by: FAMILY MEDICINE

## 2022-02-28 PROCEDURE — 1126F PR PAIN SEVERITY QUANTIFIED, NO PAIN PRESENT: ICD-10-PCS | Mod: ,,, | Performed by: FAMILY MEDICINE

## 2022-02-28 PROCEDURE — 3078F PR MOST RECENT DIASTOLIC BLOOD PRESSURE < 80 MM HG: ICD-10-PCS | Mod: ,,, | Performed by: FAMILY MEDICINE

## 2022-02-28 PROCEDURE — 1160F PR REVIEW ALL MEDS BY PRESCRIBER/CLIN PHARMACIST DOCUMENTED: ICD-10-PCS | Mod: ,,, | Performed by: FAMILY MEDICINE

## 2022-02-28 PROCEDURE — G0438 PPPS, INITIAL VISIT: HCPCS | Mod: ,,, | Performed by: FAMILY MEDICINE

## 2022-02-28 PROCEDURE — G0438 PR WELCOME MEDICARE ANNUAL WELLNESS INITIAL VISIT: ICD-10-PCS | Mod: ,,, | Performed by: FAMILY MEDICINE

## 2022-02-28 PROCEDURE — 1159F MED LIST DOCD IN RCRD: CPT | Mod: ,,, | Performed by: FAMILY MEDICINE

## 2022-02-28 PROCEDURE — 4010F ACE/ARB THERAPY RXD/TAKEN: CPT | Mod: ,,, | Performed by: FAMILY MEDICINE

## 2022-02-28 PROCEDURE — 1101F PT FALLS ASSESS-DOCD LE1/YR: CPT | Mod: ,,, | Performed by: FAMILY MEDICINE

## 2022-02-28 PROCEDURE — 1159F PR MEDICATION LIST DOCUMENTED IN MEDICAL RECORD: ICD-10-PCS | Mod: ,,, | Performed by: FAMILY MEDICINE

## 2022-02-28 PROCEDURE — 3288F PR FALLS RISK ASSESSMENT DOCUMENTED: ICD-10-PCS | Mod: ,,, | Performed by: FAMILY MEDICINE

## 2022-02-28 PROCEDURE — 1101F PR PT FALLS ASSESS DOC 0-1 FALLS W/OUT INJ PAST YR: ICD-10-PCS | Mod: ,,, | Performed by: FAMILY MEDICINE

## 2022-02-28 PROCEDURE — 3008F BODY MASS INDEX DOCD: CPT | Mod: ,,, | Performed by: FAMILY MEDICINE

## 2022-02-28 PROCEDURE — 3008F PR BODY MASS INDEX (BMI) DOCUMENTED: ICD-10-PCS | Mod: ,,, | Performed by: FAMILY MEDICINE

## 2022-02-28 RX ORDER — CILOSTAZOL 100 MG/1
50 TABLET ORAL 2 TIMES DAILY
COMMUNITY

## 2022-02-28 RX ORDER — CARVEDILOL 6.25 MG/1
6.25 TABLET ORAL 2 TIMES DAILY WITH MEALS
COMMUNITY

## 2022-02-28 RX ORDER — DILTIAZEM HYDROCHLORIDE 240 MG/1
240 CAPSULE, EXTENDED RELEASE ORAL DAILY
COMMUNITY

## 2022-02-28 NOTE — PROGRESS NOTES
[facility]     PATIENT NAME: Luis Manuel Mustafa   : 1953    AGE: 68 y.o. DATE: 2022   MRN: [id number]        Reason for Visit / Chief Complaint: Medicare AWV ([employer] Initial AWV )        Luis Manuel Mustafa presents for an Initial Medicare AWV today.     The following components were reviewed and updated:    Medical/Social/Family History:  Past Medical History:   Diagnosis Date    Atrial fibrillation     Below knee amputation     COPD (chronic obstructive pulmonary disease)     GERD (gastroesophageal reflux disease)     High cholesterol     Hypertension     Pacemaker         History reviewed. No pertinent family history.     Social History     Tobacco Use   Smoking Status Former Smoker    Packs/day: 0.25    Years: 62.00    Pack years: 15.50    Types: Cigars, Cigarettes    Start date:     Quit date:     Years since quitting: 3.1   Smokeless Tobacco Current User    Types: Snuff   Tobacco Comment    1 can every 4 days      Social History     Substance and Sexual Activity   Alcohol Use Yes    Comment: 2 pints a week       History reviewed. No pertinent family history.    Past Surgical History:   Procedure Laterality Date    BACK SURGERY      BKA Left     CARDIAC SURGERY      PACEMAKER, IVCF         · Allergies and Current Medications   Review of patient's allergies indicates:  No Known Allergies    Current Outpatient Medications:     albuterol-ipratropium (DUO-NEB) 2.5 mg-0.5 mg/3 mL nebulizer solution, Take 3 mLs by nebulization every 6 (six) hours. Rescue, Disp: 75 mL, Rfl: 0    atorvastatin (LIPITOR) 80 MG tablet, Take 1 tablet (80 mg total) by mouth nightly., Disp: 90 tablet, Rfl: 1    carvediloL (COREG) 6.25 MG tablet, Take 6.25 mg by mouth 2 (two) times daily with meals., Disp: , Rfl:     cilostazoL (PLETAL) 100 MG Tab, Take 50 mg by mouth 2 (two) times daily., Disp: , Rfl:     clopidogreL (PLAVIX) 75 mg tablet, Take 1 tablet (75 mg total) by  mouth once daily., Disp: 90 tablet, Rfl: 1    diltiaZEM (DILACOR XR) 240 MG CDCR, Take 240 mg by mouth once daily., Disp: , Rfl:     lisinopriL (PRINIVIL,ZESTRIL) 2.5 MG tablet, Take 1 tablet (2.5 mg total) by mouth once daily., Disp: 90 tablet, Rfl: 1    pantoprazole (PROTONIX) 40 MG tablet, Take 1 tablet (40 mg total) by mouth once daily., Disp: 90 tablet, Rfl: 1    · Health Risk Assessment   Fall Risk: Yes. Education given.   Obesity: BMI 18.30     Advance Directive:  Does not have an advanced directive. Verbal education and written education included in today's AVS.   Depression: PHQ9 2    HTN:   yes, DASH diet, exercise, weight management, med compliance, home BP monitoring, and follow-up discussed.   T2DM: NA   Tobacco use: Pt reports he dips 1 can suff weekly. Former Smoker. Quit 3 years ago. Pt educated on quitting tobacco and written education given. Pt voiced understanding and not ready to quit at this time.  STI: NA    Alcohol misuse: Pt reports 2 pints a week.CAGE Score 2   Statin Use: Yes      · Health Risk Assessment  What is your age?: 65-69  Are you male or female?: Male  During the past four weeks, how much have you been bothered by emotional problems such as feeling anxious, depressed, irritable, sad, or downhearted and blue?: Not at all  During the past five weeks, has your physical and/or emotional health limited your social activities with family, friends, neighbors, or groups?: Not at all  During the past four weeks, how much bodily pain have you generally had?: Moderate pain  During the past four weeks, was someone available to help if you needed and wanted help?: Yes, as much as I wanted  During the past four weeks, what was the hardest physical activity you could do for at least two minutes?: Very light  Can you get to places out of walking distance without help?  (For example, can you travel alone on buses or taxis, or drive your own car?): No  Can you go shopping for groceries or  clothes without someone's help?: No  Can you prepare your own meals?: Yes  Can you do your own housework without help?: No  Because of any health problems, do you need the help of another person with your personal care needs such as eating, bathing, dressing, or getting around the house?: Yes  Can you handle your own money without help?: Yes  During the past four weeks, how would you rate your health in general?: Poor  How have things been going for you during the past four weeks?: Pretty well  Are you having difficulties driving your car?: No  Do you always fasten your seat belt when you are in a car?: Yes, usually  How often in the past four weeks have you been bothered by falling or dizzy when standing up?: Seldom  How often in the past four weeks have you been bothered by sexual problems?: Never  How often in the past four weeks have you been bothered by trouble eating well?: Never  How often in the past four weeks have you been bothered by teeth or denture problems?: Never  How often in the past four weeks have you been bothered with problems using the telephone?: Never  How often in the past four weeks have you been bothered by tiredness or fatigue?: Never  Have you fallen two or more times in the past year?: Yes  Are you afraid of falling?: No  Are you a smoker?: Yes, I'm not ready to quit  During the past four weeks, how many drinks of wine, beer, or other alcoholic beverages did you have?: 10 or more drinks per week  Do you exercise for about 20 minutes three or more days a week?: No, I usually do not exercise this much  Have you been given any information to help you with hazards in your house that might hurt you?: No  Have you been given any information to help you with keeping track of your medications?: No  How often do you have trouble taking medicines the way you've been told to take them?: I always take them as prescribed  How confident are you that you can control and manage most of your health  problems?: Somewhat confident  What is your race? (Check all that apply.):     · Health Maintenance   Last eye exam: 2020   Last CV screen with lipids: 06/19/2016   Diabetes screening with fasting glucose or A1c: 01/17/2022   Colonoscopy: Due   Flu Vaccine: Declined   Pneumonia vaccines: Declined   COVID vaccine: Completed per pt. Pt encouraged to bring copy of card to next visit for documentation   Hep B vaccine: NA   DEXA: NA   Last pap/pelvic: NA   Last Mammogram: NA   Last PSA screen: 09/16/2015   AAA screening: Pt had CT angio aorta 01/03/2017 (once in lifetime for males 65-75 who have smoked > 100 cigarettes in lifetime)  HIV Screeing: NA  Hepatitis C Screen: Eligible  Low Dose CT Scan: Declined    The patient has no Health Maintenance topics of status Not Due  Health Maintenance Due   Topic Date Due    Hepatitis C Screening  Never done    Lipid Panel  Never done    COVID-19 Vaccine (1) Never done    Colorectal Cancer Screening  Never done        Incontinence  Bowel: No  Bladder: No    Lab results available in Epic or see dates from [facility] above:   No results found for: CHOL  No results found for: HDL  No results found for: LDLCALC  No results found for: TRIG  No results found for: CHOLHDL    No results found for: LABA1C, HGBA1C    Sodium   Date Value Ref Range Status   01/17/2022 138 136 - 145 mmol/L Final     Potassium   Date Value Ref Range Status   01/17/2022 4.5 3.5 - 5.1 mmol/L Final     Chloride   Date Value Ref Range Status   01/17/2022 102 98 - 107 mmol/L Final     CO2   Date Value Ref Range Status   01/17/2022 29 21 - 32 mmol/L Final     Glucose   Date Value Ref Range Status   01/17/2022 106 74 - 106 mg/dL Final     BUN   Date Value Ref Range Status   01/17/2022 4 (L) 7 - 18 mg/dL Final     Creatinine   Date Value Ref Range Status   01/17/2022 0.76 0.70 - 1.30 mg/dL Final     Calcium   Date Value Ref Range Status   01/17/2022 8.5 8.5 - 10.1 mg/dL Final     Total Protein   Date Value  "Ref Range Status   01/12/2022 6.6 6.4 - 8.2 g/dL Final     Albumin   Date Value Ref Range Status   01/12/2022 1.8 (L) 3.5 - 5.0 g/dL Final     Bilirubin, Total   Date Value Ref Range Status   01/12/2022 0.7 0.0 - 1.2 mg/dL Final     Alk Phos   Date Value Ref Range Status   01/12/2022 120 (H) 45 - 115 U/L Final     AST   Date Value Ref Range Status   01/12/2022 19 15 - 37 U/L Final     ALT   Date Value Ref Range Status   01/12/2022 7 (L) 16 - 61 U/L Final     Anion Gap   Date Value Ref Range Status   01/17/2022 12 7 - 16 mmol/L Final     eGFR    Date Value Ref Range Status   11/28/2020 108       eGFR   Date Value Ref Range Status   01/17/2022 108 >=60 mL/min/1.73m² Final         No results found for: PSA (Delete this line if female pt)        · Care Team   PCP: Dr. Olivier    Eye specialist: Dr. Duke              Cardiologist: Dr. Vallejo    **See Completed Assessments for Annual Wellness visit within the encounter summary    The following assessments were completed & reviewed:  · Depression Screening  · Cognitive function Screening  · Timed Get Up Test  · Whisper Test  · Vision Screen  · Health Risk Assessment  · Checklist of ADLs and IADLs      Objective  Vitals:    02/28/22 1003   BP: 132/72   Pulse: 73   Resp: 20   Temp: 97.4 °F (36.3 °C)   SpO2: 97%   Weight: 49.9 kg (110 lb)   Height: 5' 5" (1.651 m)   PainSc: 0-No pain      Body mass index is 18.3 kg/m².  Ideal body weight: 62.7 kg (138 lb 5.4 oz)       Physical Exam      Assessment:     1. Encounter for initial annual wellness visit (AWV) in Medicare patient    2. Body mass index less than 19, adult    3. Essential hypertension  - Lipid Panel; Future    4. Encounter for screening for malignant neoplasm of colon  - Ambulatory referral/consult to Gastroenterology; Future    5. Encounter for hepatitis C screening test for low risk patient  - Hepatitis C Antibody; Future    6. Encounter for special screening examination for neoplasm of " prostate    7. Encounter for screening for malignant neoplasm of prostate  - PSA, Screening; Future    8. Left below-knee amputee         Plan:    Referrals:     Advised to call office if does not hear from anyone with referral appt within 2-3 weeks to check on status of referral. Voiced understanding.        Discussed and provided with a screening schedule and personal prevention plan in accordance with USPSTF age appropriate recommendations and Medicare screening guidelines.   Education, counseling, and referrals were provided as needed.  After Visit Summary printed and given to patient which includes written education and a list of any referrals if indicated.     F/u plan for yearly AWV.    Signature: Farhana Olivier MD

## 2022-04-25 ENCOUNTER — OFFICE VISIT (OUTPATIENT)
Dept: FAMILY MEDICINE | Facility: CLINIC | Age: 67
End: 2022-04-25
Payer: COMMERCIAL

## 2022-04-25 VITALS
OXYGEN SATURATION: 99 % | RESPIRATION RATE: 18 BRPM | DIASTOLIC BLOOD PRESSURE: 62 MMHG | HEART RATE: 91 BPM | SYSTOLIC BLOOD PRESSURE: 158 MMHG | TEMPERATURE: 99 F

## 2022-04-25 DIAGNOSIS — I73.9 PVD (PERIPHERAL VASCULAR DISEASE): Primary | ICD-10-CM

## 2022-04-25 PROCEDURE — 1101F PR PT FALLS ASSESS DOC 0-1 FALLS W/OUT INJ PAST YR: ICD-10-PCS | Mod: ,,, | Performed by: FAMILY MEDICINE

## 2022-04-25 PROCEDURE — 3078F DIAST BP <80 MM HG: CPT | Mod: ,,, | Performed by: FAMILY MEDICINE

## 2022-04-25 PROCEDURE — 1101F PT FALLS ASSESS-DOCD LE1/YR: CPT | Mod: ,,, | Performed by: FAMILY MEDICINE

## 2022-04-25 PROCEDURE — 3077F PR MOST RECENT SYSTOLIC BLOOD PRESSURE >= 140 MM HG: ICD-10-PCS | Mod: ,,, | Performed by: FAMILY MEDICINE

## 2022-04-25 PROCEDURE — 3288F PR FALLS RISK ASSESSMENT DOCUMENTED: ICD-10-PCS | Mod: ,,, | Performed by: FAMILY MEDICINE

## 2022-04-25 PROCEDURE — 3078F PR MOST RECENT DIASTOLIC BLOOD PRESSURE < 80 MM HG: ICD-10-PCS | Mod: ,,, | Performed by: FAMILY MEDICINE

## 2022-04-25 PROCEDURE — 1160F PR REVIEW ALL MEDS BY PRESCRIBER/CLIN PHARMACIST DOCUMENTED: ICD-10-PCS | Mod: ,,, | Performed by: FAMILY MEDICINE

## 2022-04-25 PROCEDURE — 1159F MED LIST DOCD IN RCRD: CPT | Mod: ,,, | Performed by: FAMILY MEDICINE

## 2022-04-25 PROCEDURE — 99213 OFFICE O/P EST LOW 20 MIN: CPT | Mod: ,,, | Performed by: FAMILY MEDICINE

## 2022-04-25 PROCEDURE — 1159F PR MEDICATION LIST DOCUMENTED IN MEDICAL RECORD: ICD-10-PCS | Mod: ,,, | Performed by: FAMILY MEDICINE

## 2022-04-25 PROCEDURE — 4010F ACE/ARB THERAPY RXD/TAKEN: CPT | Mod: ,,, | Performed by: FAMILY MEDICINE

## 2022-04-25 PROCEDURE — 3077F SYST BP >= 140 MM HG: CPT | Mod: ,,, | Performed by: FAMILY MEDICINE

## 2022-04-25 PROCEDURE — 1160F RVW MEDS BY RX/DR IN RCRD: CPT | Mod: ,,, | Performed by: FAMILY MEDICINE

## 2022-04-25 PROCEDURE — 99213 PR OFFICE/OUTPT VISIT, EST, LEVL III, 20-29 MIN: ICD-10-PCS | Mod: ,,, | Performed by: FAMILY MEDICINE

## 2022-04-25 PROCEDURE — 3288F FALL RISK ASSESSMENT DOCD: CPT | Mod: ,,, | Performed by: FAMILY MEDICINE

## 2022-04-25 PROCEDURE — 4010F PR ACE/ARB THEARPY RXD/TAKEN: ICD-10-PCS | Mod: ,,, | Performed by: FAMILY MEDICINE

## 2022-04-25 NOTE — PROGRESS NOTES
Farhana Olivier MD        PATIENT NAME: Luis Manuel Mustafa  : 1953  DATE: 22  MRN: [id number]      Billing Provider: Farhana Olivier MD  Level of Service:   Patient PCP Information     Provider PCP Type    Farhana Olivier MD General          Reason for Visit / Chief Complaint: Edema (Edema in right foot)       History of Present Illness      Luis Manuel Mustafa presents to the clinic with Edema (Edema in right foot)     Edema of the right foot, no pain, has been colder than usual, he is concern because that is how his left foot began before it blister, got necrotic and was amputated. He does have PVD, he has not followed up with vascular, he is taking his medications as prescribed      Review of Systems     Review of Systems   Constitutional: Negative for activity change, appetite change, fatigue and fever.   Respiratory: Negative for shortness of breath.    Cardiovascular: Positive for leg swelling.   Musculoskeletal: Positive for arthralgias and gait problem.   Allergic/Immunologic: Positive for environmental allergies.   Psychiatric/Behavioral: Negative for agitation, behavioral problems and suicidal ideas.       Medical / Social / Family History     Past Medical History:   Diagnosis Date    Atrial fibrillation     Below knee amputation     COPD (chronic obstructive pulmonary disease)     GERD (gastroesophageal reflux disease)     High cholesterol     Hypertension     Pacemaker        Past Surgical History:   Procedure Laterality Date    BACK SURGERY      BKA Left     CARDIAC SURGERY      PACEMAKER, IVCF       Social History    reports that he quit smoking about 3 years ago. His smoking use included cigars and cigarettes. He started smoking about 65 years ago. He has a 15.50 pack-year smoking history. His smokeless tobacco use includes snuff. He reports current alcohol use. He reports that he does not use drugs.    Family History  's family history is not on file.    Medications and Allergies      Medications  No outpatient medications have been marked as taking for the 4/25/22 encounter (Office Visit) with Farhana Olivier MD.       Allergies  Review of patient's allergies indicates:  No Known Allergies    Physical Examination   BP (!) 158/62   Pulse 91   Temp 99 °F (37.2 °C) (Temporal)   Resp 18   SpO2 99%     Physical Exam  Cardiovascular:      Pulses:           Dorsalis pedis pulses are 0 on the right side.   Musculoskeletal:      Left Lower Extremity: Left leg is amputated below knee.   Feet:      Right foot:      Protective Sensation: 3 sites tested. 1 site sensed.     Skin integrity: No ulcer, blister, skin breakdown, erythema or warmth.         Assessment and Plan (including Health Maintenance)     Plan:         Problem List Items Addressed This Visit    None     Visit Diagnoses     PVD (peripheral vascular disease)    -  Primary    Relevant Orders    US Lower Extremity Veins Right    US Lower Extrem Arteries Right with LINDA (xpd)    Ambulatory referral/consult to Cardiology          Will send him for LINDA and Venous US, will need to be seen for vascular intervention sending to cardiology department who does such procedures.      Follow up in about 2 weeks (around 5/9/2022).        Signature:  Farhana Olivier MD      Date of encounter: 4/25/22

## 2022-04-28 ENCOUNTER — HOSPITAL ENCOUNTER (OUTPATIENT)
Dept: RADIOLOGY | Facility: HOSPITAL | Age: 67
Discharge: HOME | End: 2022-04-28
Attending: FAMILY MEDICINE
Payer: COMMERCIAL

## 2022-04-28 DIAGNOSIS — I73.9 PVD (PERIPHERAL VASCULAR DISEASE): ICD-10-CM

## 2022-04-28 PROCEDURE — 93971 EXTREMITY STUDY: CPT | Mod: TC,RT

## 2022-04-28 PROCEDURE — 93926 LOWER EXTREMITY STUDY: CPT | Mod: TC,RT

## 2022-05-02 DIAGNOSIS — I73.9 PVD (PERIPHERAL VASCULAR DISEASE): Primary | ICD-10-CM

## 2022-05-06 ENCOUNTER — OFFICE VISIT (OUTPATIENT)
Dept: CARDIOLOGY | Facility: CLINIC | Age: 67
End: 2022-05-06
Payer: COMMERCIAL

## 2022-05-06 VITALS
HEART RATE: 74 BPM | RESPIRATION RATE: 18 BRPM | HEIGHT: 65 IN | SYSTOLIC BLOOD PRESSURE: 130 MMHG | DIASTOLIC BLOOD PRESSURE: 60 MMHG | WEIGHT: 107 LBS | BODY MASS INDEX: 17.83 KG/M2

## 2022-05-06 DIAGNOSIS — I70.229 CRITICAL LOWER LIMB ISCHEMIA: ICD-10-CM

## 2022-05-06 DIAGNOSIS — Z01.812 PRE-OPERATIVE LABORATORY EXAMINATION: Primary | ICD-10-CM

## 2022-05-06 DIAGNOSIS — I73.9 PVD (PERIPHERAL VASCULAR DISEASE): ICD-10-CM

## 2022-05-06 PROCEDURE — 4010F PR ACE/ARB THEARPY RXD/TAKEN: ICD-10-PCS | Mod: CPTII,,, | Performed by: STUDENT IN AN ORGANIZED HEALTH CARE EDUCATION/TRAINING PROGRAM

## 2022-05-06 PROCEDURE — 1159F MED LIST DOCD IN RCRD: CPT | Mod: CPTII,,, | Performed by: STUDENT IN AN ORGANIZED HEALTH CARE EDUCATION/TRAINING PROGRAM

## 2022-05-06 PROCEDURE — 3008F BODY MASS INDEX DOCD: CPT | Mod: CPTII,,, | Performed by: STUDENT IN AN ORGANIZED HEALTH CARE EDUCATION/TRAINING PROGRAM

## 2022-05-06 PROCEDURE — 3008F PR BODY MASS INDEX (BMI) DOCUMENTED: ICD-10-PCS | Mod: CPTII,,, | Performed by: STUDENT IN AN ORGANIZED HEALTH CARE EDUCATION/TRAINING PROGRAM

## 2022-05-06 PROCEDURE — 3075F PR MOST RECENT SYSTOLIC BLOOD PRESS GE 130-139MM HG: ICD-10-PCS | Mod: CPTII,,, | Performed by: STUDENT IN AN ORGANIZED HEALTH CARE EDUCATION/TRAINING PROGRAM

## 2022-05-06 PROCEDURE — 99214 OFFICE O/P EST MOD 30 MIN: CPT | Mod: PBBFAC | Performed by: STUDENT IN AN ORGANIZED HEALTH CARE EDUCATION/TRAINING PROGRAM

## 2022-05-06 PROCEDURE — 99204 PR OFFICE/OUTPT VISIT, NEW, LEVL IV, 45-59 MIN: ICD-10-PCS | Mod: S$PBB,,, | Performed by: STUDENT IN AN ORGANIZED HEALTH CARE EDUCATION/TRAINING PROGRAM

## 2022-05-06 PROCEDURE — 4010F ACE/ARB THERAPY RXD/TAKEN: CPT | Mod: CPTII,,, | Performed by: STUDENT IN AN ORGANIZED HEALTH CARE EDUCATION/TRAINING PROGRAM

## 2022-05-06 PROCEDURE — 3078F PR MOST RECENT DIASTOLIC BLOOD PRESSURE < 80 MM HG: ICD-10-PCS | Mod: CPTII,,, | Performed by: STUDENT IN AN ORGANIZED HEALTH CARE EDUCATION/TRAINING PROGRAM

## 2022-05-06 PROCEDURE — 1159F PR MEDICATION LIST DOCUMENTED IN MEDICAL RECORD: ICD-10-PCS | Mod: CPTII,,, | Performed by: STUDENT IN AN ORGANIZED HEALTH CARE EDUCATION/TRAINING PROGRAM

## 2022-05-06 PROCEDURE — 3075F SYST BP GE 130 - 139MM HG: CPT | Mod: CPTII,,, | Performed by: STUDENT IN AN ORGANIZED HEALTH CARE EDUCATION/TRAINING PROGRAM

## 2022-05-06 PROCEDURE — 99204 OFFICE O/P NEW MOD 45 MIN: CPT | Mod: S$PBB,,, | Performed by: STUDENT IN AN ORGANIZED HEALTH CARE EDUCATION/TRAINING PROGRAM

## 2022-05-06 PROCEDURE — 3078F DIAST BP <80 MM HG: CPT | Mod: CPTII,,, | Performed by: STUDENT IN AN ORGANIZED HEALTH CARE EDUCATION/TRAINING PROGRAM

## 2022-05-06 NOTE — H&P (VIEW-ONLY)
PCP: Farhana Olivier MD    Referring Provider:     Subjective:   Luis Manuel Mustafa is a 68 y.o. male with hx of afib, COPD, HTN, PAD s/p Right CFA endarterectomy with  fem-fem bypass s/p left BKA for gangrene of left foot in 1/2020 who presents for evaluation of right foot pain    5/6/22 - Pt states his right foot is swelling, black discoloration to foot around ankle, for the past month.   He states that is how the left foot started before amputation.  He reports pain in foot.    Patient states he has occasional chest pain and shortness of breath.     Fhx: Negative for heart disease.   Shx: former smoker, uses smokeless, alcohol use - two pints weekly. Marijuana use.     EKG 5/6/22 - Sinus tachycardia with 1st degree A-V block, Biatrial enlargement, Anterior infarct ,age undetermined     Right LINDA/US duplex 4/2022-   Ankle brachial indices are 0.9 on the right and not obtained on the left secondary to prior below-knee amputation on the left   Ultrasound 4/25/22  - Evidence of at least moderate right iliac artery disease.   Additional severe right calf vessel disease.       Lab Results   Component Value Date     (H) 05/06/2022    K 3.4 (L) 05/06/2022     (H) 05/06/2022    CO2 29 05/06/2022    BUN 14 05/06/2022    CREATININE 0.79 05/06/2022    CALCIUM 8.7 05/06/2022    ANIONGAP 13 05/06/2022    ESTGFRAFRICA 108 11/28/2020    EGFRNONAA 103 05/06/2022       Lab Results   Component Value Date    CHOL 202 (H) 03/03/2022     Lab Results   Component Value Date     (H) 03/03/2022     Lab Results   Component Value Date    LDLCALC 52 03/03/2022     Lab Results   Component Value Date    TRIG 74 03/03/2022     Lab Results   Component Value Date    CHOLHDL 1.5 03/03/2022       Lab Results   Component Value Date    WBC 4.91 05/06/2022    HGB 11.4 (L) 05/06/2022    HCT 34.1 (L) 05/06/2022    .9 (H) 05/06/2022     05/06/2022           Review of Systems   Respiratory: Positive for shortness of breath.  "Negative for cough.    Cardiovascular: Positive for leg swelling. Negative for chest pain, palpitations, orthopnea, claudication and PND.         Objective:   /60   Pulse 74   Resp 18   Ht 5' 5" (1.651 m)   Wt 48.5 kg (107 lb)   BMI 17.81 kg/m²     Physical Exam  Cardiovascular:      Rate and Rhythm: Normal rate and regular rhythm.      Pulses:           Dorsalis pedis pulses are 0 on the right side. Left dorsalis pedis pulse not accessible.        Posterior tibial pulses are detected w/ Doppler on the right side. Left posterior tibial pulse not accessible.      Heart sounds: Normal heart sounds. No murmur heard.  Pulmonary:      Effort: Pulmonary effort is normal.      Breath sounds: Normal breath sounds.   Musculoskeletal:         General: Swelling present.      Right lower leg: No edema.      Comments: Left below knee amputation    Neurological:      Mental Status: He is alert and oriented to person, place, and time.           Assessment:     1. PVD (peripheral vascular disease)  Ambulatory referral/consult to Cardiology    Ambulatory referral/consult to Cardiology   2. Critical lower limb ischemia           Plan:   Critical lower limb ischemia  Hx of Left CFA endearterctomy and fem-fem bypass  Now with right leg discomfort, swelling and black discoloration around the medial malleolus  - On examination PT dopplerable; DP - not doplerable. Foot is warm.   - On aspirin, statin and pletal  - LINDA 0.9   - Plan for peripheral angiography +/- intervention.       "

## 2022-05-07 PROBLEM — I70.229 CRITICAL LOWER LIMB ISCHEMIA: Status: ACTIVE | Noted: 2022-05-07

## 2022-05-07 NOTE — ASSESSMENT & PLAN NOTE
Hx of Left CFA endearterctomy and fem-fem bypass  Now with right leg discomfort, swelling and black discoloration around the medial malleolus  - On examination PT dopplerable; DP - not doplerable. Foot is warm.   - On aspirin, statin and pletal  - LINDA 0.9   - Plan for peripheral angiography +/- intervention.

## 2022-05-09 DIAGNOSIS — I73.9 PERIPHERAL VASCULAR DISEASE: Primary | ICD-10-CM

## 2022-05-09 RX ORDER — SODIUM CHLORIDE 0.9 % (FLUSH) 0.9 %
2 SYRINGE (ML) INJECTION
Status: CANCELLED | OUTPATIENT
Start: 2022-05-12

## 2022-05-12 ENCOUNTER — HOSPITAL ENCOUNTER (OUTPATIENT)
Facility: HOSPITAL | Age: 67
Discharge: HOME | End: 2022-05-12
Attending: STUDENT IN AN ORGANIZED HEALTH CARE EDUCATION/TRAINING PROGRAM | Admitting: STUDENT IN AN ORGANIZED HEALTH CARE EDUCATION/TRAINING PROGRAM
Payer: COMMERCIAL

## 2022-05-12 VITALS
WEIGHT: 100.69 LBS | SYSTOLIC BLOOD PRESSURE: 146 MMHG | BODY MASS INDEX: 16.78 KG/M2 | HEART RATE: 71 BPM | DIASTOLIC BLOOD PRESSURE: 66 MMHG | OXYGEN SATURATION: 99 % | HEIGHT: 65 IN | RESPIRATION RATE: 16 BRPM | TEMPERATURE: 98 F

## 2022-05-12 DIAGNOSIS — I73.9 PVD (PERIPHERAL VASCULAR DISEASE): ICD-10-CM

## 2022-05-12 DIAGNOSIS — I73.9 PERIPHERAL VASCULAR DISEASE: ICD-10-CM

## 2022-05-12 PROCEDURE — 93005 ELECTROCARDIOGRAM TRACING: CPT

## 2022-05-12 PROCEDURE — 99152 MOD SED SAME PHYS/QHP 5/>YRS: CPT | Performed by: INTERNAL MEDICINE

## 2022-05-12 PROCEDURE — 75710 ARTERY X-RAYS ARM/LEG: CPT | Mod: 26,RT,, | Performed by: INTERNAL MEDICINE

## 2022-05-12 PROCEDURE — 36247 INS CATH ABD/L-EXT ART 3RD: CPT | Mod: RT,,, | Performed by: INTERNAL MEDICINE

## 2022-05-12 PROCEDURE — 63600175 PHARM REV CODE 636 W HCPCS: Performed by: INTERNAL MEDICINE

## 2022-05-12 PROCEDURE — 27800903 OPTIME MED/SURG SUP & DEVICES OTHER IMPLANTS: Performed by: INTERNAL MEDICINE

## 2022-05-12 PROCEDURE — 93010 ELECTROCARDIOGRAM REPORT: CPT | Mod: ,,, | Performed by: STUDENT IN AN ORGANIZED HEALTH CARE EDUCATION/TRAINING PROGRAM

## 2022-05-12 PROCEDURE — 25500020 PHARM REV CODE 255: Performed by: INTERNAL MEDICINE

## 2022-05-12 PROCEDURE — 99153 MOD SED SAME PHYS/QHP EA: CPT | Performed by: INTERNAL MEDICINE

## 2022-05-12 PROCEDURE — 25000003 PHARM REV CODE 250: Performed by: STUDENT IN AN ORGANIZED HEALTH CARE EDUCATION/TRAINING PROGRAM

## 2022-05-12 PROCEDURE — C1713 ANCHOR/SCREW BN/BN,TIS/BN: HCPCS | Performed by: INTERNAL MEDICINE

## 2022-05-12 PROCEDURE — 75630 PR  ANGIO AORTOBIFEMORAL W CATH: ICD-10-PCS | Mod: 26,59,, | Performed by: INTERNAL MEDICINE

## 2022-05-12 PROCEDURE — 93010 EKG 12-LEAD: ICD-10-PCS | Mod: ,,, | Performed by: STUDENT IN AN ORGANIZED HEALTH CARE EDUCATION/TRAINING PROGRAM

## 2022-05-12 PROCEDURE — 27201423 OPTIME MED/SURG SUP & DEVICES STERILE SUPPLY: Performed by: INTERNAL MEDICINE

## 2022-05-12 PROCEDURE — 75630 X-RAY AORTA LEG ARTERIES: CPT | Mod: 59 | Performed by: INTERNAL MEDICINE

## 2022-05-12 PROCEDURE — 75710 PR  ANGIO EXTREMITY UNILAT: ICD-10-PCS | Mod: 26,RT,, | Performed by: INTERNAL MEDICINE

## 2022-05-12 PROCEDURE — 25000003 PHARM REV CODE 250: Performed by: INTERNAL MEDICINE

## 2022-05-12 PROCEDURE — 36247 INS CATH ABD/L-EXT ART 3RD: CPT | Mod: RT | Performed by: INTERNAL MEDICINE

## 2022-05-12 PROCEDURE — 36247 PR PLACE CATH SUBSUBSELECT ART,ABD/PEL: ICD-10-PCS | Mod: RT,,, | Performed by: INTERNAL MEDICINE

## 2022-05-12 PROCEDURE — C1894 INTRO/SHEATH, NON-LASER: HCPCS | Performed by: INTERNAL MEDICINE

## 2022-05-12 PROCEDURE — 75630 X-RAY AORTA LEG ARTERIES: CPT | Mod: 26,59,, | Performed by: INTERNAL MEDICINE

## 2022-05-12 PROCEDURE — 27000080 OPTIME MED/SURG SUP & DEVICES GENERAL CLASSIFICATION: Performed by: INTERNAL MEDICINE

## 2022-05-12 PROCEDURE — C1887 CATHETER, GUIDING: HCPCS | Performed by: INTERNAL MEDICINE

## 2022-05-12 PROCEDURE — 75710 ARTERY X-RAYS ARM/LEG: CPT | Mod: RT | Performed by: INTERNAL MEDICINE

## 2022-05-12 PROCEDURE — C1769 GUIDE WIRE: HCPCS | Performed by: INTERNAL MEDICINE

## 2022-05-12 PROCEDURE — 27100168 OPTIME MED/SURG SUP & DEVICES NON-STERILE SUPPLY: Performed by: INTERNAL MEDICINE

## 2022-05-12 RX ORDER — SODIUM CHLORIDE 9 MG/ML
INJECTION, SOLUTION INTRAVENOUS CONTINUOUS
Status: DISCONTINUED | OUTPATIENT
Start: 2022-05-12 | End: 2022-05-12 | Stop reason: HOSPADM

## 2022-05-12 RX ORDER — FENTANYL CITRATE 50 UG/ML
INJECTION, SOLUTION INTRAMUSCULAR; INTRAVENOUS
Status: DISCONTINUED | OUTPATIENT
Start: 2022-05-12 | End: 2022-05-12 | Stop reason: HOSPADM

## 2022-05-12 RX ORDER — SODIUM CHLORIDE 450 MG/100ML
INJECTION, SOLUTION INTRAVENOUS
Status: DISCONTINUED | OUTPATIENT
Start: 2022-05-12 | End: 2022-05-12 | Stop reason: HOSPADM

## 2022-05-12 RX ORDER — SODIUM CHLORIDE 0.9 % (FLUSH) 0.9 %
2 SYRINGE (ML) INJECTION
Status: DISCONTINUED | OUTPATIENT
Start: 2022-05-12 | End: 2022-05-12 | Stop reason: HOSPADM

## 2022-05-12 RX ORDER — ACETAMINOPHEN 325 MG/1
650 TABLET ORAL EVERY 4 HOURS PRN
Status: DISCONTINUED | OUTPATIENT
Start: 2022-05-12 | End: 2022-05-12 | Stop reason: HOSPADM

## 2022-05-12 RX ORDER — LIDOCAINE HYDROCHLORIDE 10 MG/ML
INJECTION INFILTRATION; PERINEURAL
Status: DISCONTINUED | OUTPATIENT
Start: 2022-05-12 | End: 2022-05-12 | Stop reason: HOSPADM

## 2022-05-12 RX ORDER — HEPARIN SOD,PORCINE/0.9 % NACL 1000/500ML
INTRAVENOUS SOLUTION INTRAVENOUS
Status: DISCONTINUED | OUTPATIENT
Start: 2022-05-12 | End: 2022-05-12 | Stop reason: HOSPADM

## 2022-05-12 RX ORDER — ONDANSETRON 4 MG/1
8 TABLET, ORALLY DISINTEGRATING ORAL EVERY 8 HOURS PRN
Status: DISCONTINUED | OUTPATIENT
Start: 2022-05-12 | End: 2022-05-12 | Stop reason: HOSPADM

## 2022-05-12 RX ORDER — FOLIC ACID 0.4 MG
400 TABLET ORAL DAILY
COMMUNITY

## 2022-05-12 RX ORDER — MIDAZOLAM HYDROCHLORIDE 1 MG/ML
INJECTION INTRAMUSCULAR; INTRAVENOUS
Status: DISCONTINUED | OUTPATIENT
Start: 2022-05-12 | End: 2022-05-12 | Stop reason: HOSPADM

## 2022-05-12 NOTE — DISCHARGE INSTRUCTIONS
CARE OF YOUR PUNCTURE SITE  *You or someone else, if you are unable, must inspect the site daily.  *DO NOT sit in a bathtub or pool of water for 5 days or until wound has healed.  *You may shower 24 hours after the procedure. Always use a clean washcloth to care for your incision and the area around it and a second washcloth for your body. Remove the bandaid before showering. Gently clean site using soap and water while standing in the shower. Dry thoroughly.  *After your shower, apply an antibacterial ointment to the site and cover with a bandaid the first day after your cath.  If you choose not to shower this day, you will still need to clean and redress your cath site.  *If you start bleeding at the site lay down while either you or someone else holds pressure over the site for 10 minutes without letting up. Seek help immediately if it does not stop bleeding.                                                                                                          ACTIVITY  *Fatigue is common for 1-2 days.  *You may resume normal activity in 2-3 days, letting pain be your guide.  *Limit lifting over 10 pounds for one week or until wound heals.   *Over the next few days, if you have to cough, laugh, or sneeze, hold pressure on the site with your hand while doing so.                                                                                          NORMAL OBSERVATIONS  *Soreness or tenderness that may last one week.  *Mild oozing from the site.  *Possible bruising that could last 2 weeks.  *Formation of a small lump (dime to quarter size) which may last up to 6 weeks.        CALL THE DOCTOR IMMEDIATELY OR GO TO THE EMERGENCY DEPARTMENT IF YOU EXPERIENCE ANY OF THE FOLLOWING  *Significant bleeding that does not stop after 10 minutes of applying firm pressure.  *Increased swelling at access site or extremity.  *Unusual pain at  access site:extremity or back pain.  *Signs of infection:redness, warmth to touch, drainage other than a little blood or pink tinged drainage on the bandaid, poorly healing puncture site, fever, or chills.

## 2022-05-12 NOTE — NURSING
Discharge instructions reviewed with patient and daughter and copy given to debbie. Patient and daughter voiced understanding regarding follow up appts, and the following:                                                                                          CARE OF YOUR PUNCTURE SITE  *You or someone else, if you are unable, must inspect the site daily.  *DO NOT sit in a bathtub or pool of water for 5 days or until wound has healed.  *You may shower 24 hours after the procedure. Always use a clean washcloth to care for your incision and the area around it and a second washcloth for your body. Remove the bandaid before showering. Gently clean site using soap and water while standing in the shower. Dry thoroughly.  *After your shower, apply an antibacterial ointment to the site and cover with a bandaid the first day after your cath.  If you choose not to shower this day, you will still need to clean and redress your cath site.  *If you start bleeding at the site lay down while either you or someone else holds pressure over the site for 10 minutes without letting up. Seek help immediately if it does not stop bleeding.                                                                                                          ACTIVITY  *Fatigue is common for 1-2 days.  *You may resume normal activity in 2-3 days, letting pain be your guide.  *Limit lifting over 10 pounds for one week or until wound heals.   *Over the next few days, if you have to cough, laugh, or sneeze, hold pressure on the site with your hand while doing so.                                                                                          NORMAL OBSERVATIONS  *Soreness or tenderness that may last one week.  *Mild oozing from the site.  *Possible bruising that could last 2 weeks.  *Formation of a small lump (dime to quarter size) which may last up to 6 weeks.        CALL THE DOCTOR IMMEDIATELY OR GO TO THE EMERGENCY DEPARTMENT IF YOU EXPERIENCE  ANY OF THE FOLLOWING  *Significant bleeding that does not stop after 10 minutes of applying firm pressure.  *Increased swelling at access site or extremity.  *Unusual pain at access site:extremity or back pain.  *Signs of infection:redness, warmth to touch, drainage other than a little blood or pink tinged drainage on the bandaid, poorly healing puncture site, fever, or chills.   Will follow up with patient tomorrow

## 2022-05-12 NOTE — PLAN OF CARE
Problem: Adult Inpatient Plan of Care  Goal: Plan of Care Review  Outcome: Ongoing, Progressing  Goal: Patient-Specific Goal (Individualized)  Outcome: Ongoing, Progressing  Goal: Absence of Hospital-Acquired Illness or Injury  Outcome: Ongoing, Progressing  Goal: Optimal Comfort and Wellbeing  Outcome: Ongoing, Progressing  Goal: Readiness for Transition of Care  Outcome: Ongoing, Progressing     Problem: Fluid Imbalance (Pneumonia)  Goal: Fluid Balance  Outcome: Ongoing, Progressing     Problem: Infection (Pneumonia)  Goal: Resolution of Infection Signs and Symptoms  Outcome: Ongoing, Progressing     Problem: Respiratory Compromise (Pneumonia)  Goal: Effective Oxygenation and Ventilation  Outcome: Ongoing, Progressing     Problem: Fall Injury Risk  Goal: Absence of Fall and Fall-Related Injury  Outcome: Ongoing, Progressing     Problem: Arrhythmia/Dysrhythmia (Cardiac Catheterization)  Goal: Stable Heart Rate and Rhythm  Outcome: Ongoing, Progressing     Problem: Bleeding (Cardiac Catheterization)  Goal: Absence of Bleeding  Outcome: Ongoing, Progressing     Problem: Contrast-Induced Injury Risk (Cardiac Catheterization)  Goal: Absence of Contrast-Induced Injury  Outcome: Ongoing, Progressing     Problem: Embolism (Cardiac Catheterization)  Goal: Absence of Embolism Signs and Symptoms  Outcome: Ongoing, Progressing     Problem: Ongoing Anesthesia/Sedation Effects (Cardiac Catheterization)  Goal: Anesthesia/Sedation Recovery  Outcome: Ongoing, Progressing     Problem: Pain (Cardiac Catheterization)  Goal: Acceptable Pain Control  Outcome: Ongoing, Progressing     Problem: Vascular Access Protection (Cardiac Catheterization)  Goal: Absence of Vascular Access Complication  Outcome: Ongoing, Progressing   Oriented to room/unit

## 2022-05-12 NOTE — Clinical Note
The right groin was prepped. The site was prepped with ChloraPrep. The patient was draped. The patient was positioned supine.

## 2022-05-12 NOTE — INTERVAL H&P NOTE
The patient has been examined and the H&P has been reviewed:    I concur with the findings and no changes have occurred since H&P was written.    Procedure risks, benefits and alternative options discussed and understood by patient/family.    Patient has right foot pain and discoloration concerning for CLI.    Discussed in detail the risks and benefits of the procedure. Risks include kidney injury, vascular injury or death.   Patient understands the risks and wants to proceed with the procedure.        There are no hospital problems to display for this patient.

## 2022-05-13 DIAGNOSIS — Z89.519: Primary | ICD-10-CM

## 2022-05-14 ENCOUNTER — TELEPHONE (OUTPATIENT)
Dept: ORTHOPEDICS | Facility: HOSPITAL | Age: 67
End: 2022-05-14
Payer: COMMERCIAL

## 2022-05-16 ENCOUNTER — TELEPHONE (OUTPATIENT)
Dept: ORTHOPEDICS | Facility: HOSPITAL | Age: 67
End: 2022-05-16
Payer: COMMERCIAL

## 2022-05-16 NOTE — TELEPHONE ENCOUNTER
Patient states doing well, cath site clean dry and intact, no complaints voiced. Patient will follow up with Dr Mcdonald on thursday

## 2022-05-18 NOTE — PROGRESS NOTES
PCP: Farhana Olivier MD    Referring Provider:     Subjective:   Luis Manuel Mustafa is a 68 y.o. 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  He states right leg is still swollen some.      5/6/22 - Pt states his right foot is swelling, black discoloration to foot around ankle, for the past month.   He states that is how the left foot started before amputation.  He reports pain in foot.    Patient states he has occasional chest pain and shortness of breath.     Fhx: Negative for heart disease.   Shx: former smoker, uses smokeless, alcohol use - two pints weekly. Marijuana use.     EKG 5/6/22 - Sinus tachycardia with 1st degree A-V block, Biatrial enlargement, Anterior infarct ,age undetermined            5/12/22 - Sinus rhythm with 1st degree A-V block.  Rightward axis.  Nonspecific T wave abnormality     Right LINDA/US duplex 4/2022-   Ankle brachial indices are 0.9 on the right and not obtained on the left secondary to prior below-knee amputation on the left   Ultrasound 4/25/22  - Evidence of at least moderate right iliac artery disease.   Additional severe right calf vessel disease.       Peripheral angiogram  5/12/22 -    The Prox L ORIANA to Mid L ORIAAN lesion was 100% stenosed.    The Mid R EIA to Dist R EIA lesion was 90% stenosed.    The Mid R SFA to Dist R SFA lesion was 50% stenosed.    The Ost R REESE lesion was 100% stenosed.     The Ost R PTA lesion was 100% stenosed.      Right to left Fem fem bypass noted. Iliac stenosis is just proximal to the fem fem, heavily calcified.         Lab Results   Component Value Date     (H) 05/06/2022    K 3.4 (L) 05/06/2022     (H) 05/06/2022    CO2 29 05/06/2022    BUN 14 05/06/2022    CREATININE 0.79 05/06/2022    CALCIUM 8.7 05/06/2022    ANIONGAP 13 05/06/2022     "ESTGFRAFTUAN 108 11/28/2020    EGFRNONAA 103 05/06/2022       Lab Results   Component Value Date    CHOL 202 (H) 03/03/2022     Lab Results   Component Value Date     (H) 03/03/2022     Lab Results   Component Value Date    LDLCALC 52 03/03/2022     Lab Results   Component Value Date    TRIG 74 03/03/2022     Lab Results   Component Value Date    CHOLHDL 1.5 03/03/2022       Lab Results   Component Value Date    WBC 4.91 05/06/2022    HGB 11.4 (L) 05/06/2022    HCT 34.1 (L) 05/06/2022    .9 (H) 05/06/2022     05/06/2022           Review of Systems   Respiratory: Positive for shortness of breath. Negative for cough.    Cardiovascular: Positive for leg swelling. Negative for chest pain, palpitations, orthopnea, claudication and PND.         Objective:   /70   Pulse 68   Resp 18   Ht 5' 5" (1.651 m)   Wt 48.5 kg (107 lb)   BMI 17.81 kg/m²     Physical Exam  Cardiovascular:      Rate and Rhythm: Normal rate and regular rhythm.      Pulses:           Dorsalis pedis pulses are 0 on the right side. Left dorsalis pedis pulse not accessible.        Posterior tibial pulses are detected w/ Doppler on the right side. Left posterior tibial pulse not accessible.      Heart sounds: Normal heart sounds. No murmur heard.  Pulmonary:      Effort: Pulmonary effort is normal.      Breath sounds: Normal breath sounds.   Musculoskeletal:         General: Swelling present.      Right lower leg: No edema.      Comments: Left below knee amputation    Neurological:      Mental Status: He is alert and oriented to person, place, and time.           Assessment:     1. Critical lower limb ischemia           Plan:   Critical lower limb ischemia  Hx of Left CFA endearterctomy and fem-fem bypass  Now with right leg discomfort, swelling and black discoloration around the medial malleolus  - On examination PT dopplerable; DP - not doplerable. Foot is warm.   - On aspirin, statin and pletal  - LINDA 0.9   - Peripheral " angiography reveals a 90% ext. Iliac artery stenosis with severe calcification, also has 70% diffuse SFA disease with single vessel run-off below knee (peronal patent)  - plan for right iliac artery intervention with Shockwave. Will schedule procedure as soon has we get the shockwave catheter.   RTC in 4 weeks.

## 2022-05-18 NOTE — H&P (VIEW-ONLY)
PCP: Farhana Olivier MD    Referring Provider:     Subjective:   Luis Manuel Mustafa is a 68 y.o. 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  He states right leg is still swollen some.      5/6/22 - Pt states his right foot is swelling, black discoloration to foot around ankle, for the past month.   He states that is how the left foot started before amputation.  He reports pain in foot.    Patient states he has occasional chest pain and shortness of breath.     Fhx: Negative for heart disease.   Shx: former smoker, uses smokeless, alcohol use - two pints weekly. Marijuana use.     EKG 5/6/22 - Sinus tachycardia with 1st degree A-V block, Biatrial enlargement, Anterior infarct ,age undetermined            5/12/22 - Sinus rhythm with 1st degree A-V block.  Rightward axis.  Nonspecific T wave abnormality     Right LINDA/US duplex 4/2022-   Ankle brachial indices are 0.9 on the right and not obtained on the left secondary to prior below-knee amputation on the left   Ultrasound 4/25/22  - Evidence of at least moderate right iliac artery disease.   Additional severe right calf vessel disease.       Peripheral angiogram  5/12/22 -    The Prox L ORAINA to Mid L ORIANA lesion was 100% stenosed.    The Mid R EIA to Dist R EIA lesion was 90% stenosed.    The Mid R SFA to Dist R SFA lesion was 50% stenosed.    The Ost R REESE lesion was 100% stenosed.     The Ost R PTA lesion was 100% stenosed.      Right to left Fem fem bypass noted. Iliac stenosis is just proximal to the fem fem, heavily calcified.         Lab Results   Component Value Date     (H) 05/06/2022    K 3.4 (L) 05/06/2022     (H) 05/06/2022    CO2 29 05/06/2022    BUN 14 05/06/2022    CREATININE 0.79 05/06/2022    CALCIUM 8.7 05/06/2022    ANIONGAP 13 05/06/2022     "ESTGFRAFTUAN 108 11/28/2020    EGFRNONAA 103 05/06/2022       Lab Results   Component Value Date    CHOL 202 (H) 03/03/2022     Lab Results   Component Value Date     (H) 03/03/2022     Lab Results   Component Value Date    LDLCALC 52 03/03/2022     Lab Results   Component Value Date    TRIG 74 03/03/2022     Lab Results   Component Value Date    CHOLHDL 1.5 03/03/2022       Lab Results   Component Value Date    WBC 4.91 05/06/2022    HGB 11.4 (L) 05/06/2022    HCT 34.1 (L) 05/06/2022    .9 (H) 05/06/2022     05/06/2022           Review of Systems   Respiratory: Positive for shortness of breath. Negative for cough.    Cardiovascular: Positive for leg swelling. Negative for chest pain, palpitations, orthopnea, claudication and PND.         Objective:   /70   Pulse 68   Resp 18   Ht 5' 5" (1.651 m)   Wt 48.5 kg (107 lb)   BMI 17.81 kg/m²     Physical Exam  Cardiovascular:      Rate and Rhythm: Normal rate and regular rhythm.      Pulses:           Dorsalis pedis pulses are 0 on the right side. Left dorsalis pedis pulse not accessible.        Posterior tibial pulses are detected w/ Doppler on the right side. Left posterior tibial pulse not accessible.      Heart sounds: Normal heart sounds. No murmur heard.  Pulmonary:      Effort: Pulmonary effort is normal.      Breath sounds: Normal breath sounds.   Musculoskeletal:         General: Swelling present.      Right lower leg: No edema.      Comments: Left below knee amputation    Neurological:      Mental Status: He is alert and oriented to person, place, and time.           Assessment:     1. Critical lower limb ischemia           Plan:   Critical lower limb ischemia  Hx of Left CFA endearterctomy and fem-fem bypass  Now with right leg discomfort, swelling and black discoloration around the medial malleolus  - On examination PT dopplerable; DP - not doplerable. Foot is warm.   - On aspirin, statin and pletal  - LINDA 0.9   - Peripheral " angiography reveals a 90% ext. Iliac artery stenosis with severe calcification, also has 70% diffuse SFA disease with single vessel run-off below knee (peronal patent)  - plan for right iliac artery intervention with Shockwave. Will schedule procedure as soon has we get the shockwave catheter.   RTC in 4 weeks.

## 2022-05-19 ENCOUNTER — OFFICE VISIT (OUTPATIENT)
Dept: CARDIOLOGY | Facility: CLINIC | Age: 67
End: 2022-05-19
Payer: COMMERCIAL

## 2022-05-19 VITALS
BODY MASS INDEX: 17.83 KG/M2 | HEART RATE: 68 BPM | RESPIRATION RATE: 18 BRPM | HEIGHT: 65 IN | WEIGHT: 107 LBS | SYSTOLIC BLOOD PRESSURE: 120 MMHG | DIASTOLIC BLOOD PRESSURE: 70 MMHG

## 2022-05-19 DIAGNOSIS — I70.229 CRITICAL LOWER LIMB ISCHEMIA: ICD-10-CM

## 2022-05-19 PROCEDURE — 99213 OFFICE O/P EST LOW 20 MIN: CPT | Mod: PBBFAC | Performed by: STUDENT IN AN ORGANIZED HEALTH CARE EDUCATION/TRAINING PROGRAM

## 2022-05-19 PROCEDURE — 3078F DIAST BP <80 MM HG: CPT | Mod: CPTII,,, | Performed by: STUDENT IN AN ORGANIZED HEALTH CARE EDUCATION/TRAINING PROGRAM

## 2022-05-19 PROCEDURE — 1159F PR MEDICATION LIST DOCUMENTED IN MEDICAL RECORD: ICD-10-PCS | Mod: CPTII,,, | Performed by: STUDENT IN AN ORGANIZED HEALTH CARE EDUCATION/TRAINING PROGRAM

## 2022-05-19 PROCEDURE — 3074F SYST BP LT 130 MM HG: CPT | Mod: CPTII,,, | Performed by: STUDENT IN AN ORGANIZED HEALTH CARE EDUCATION/TRAINING PROGRAM

## 2022-05-19 PROCEDURE — 3008F BODY MASS INDEX DOCD: CPT | Mod: CPTII,,, | Performed by: STUDENT IN AN ORGANIZED HEALTH CARE EDUCATION/TRAINING PROGRAM

## 2022-05-19 PROCEDURE — 3074F PR MOST RECENT SYSTOLIC BLOOD PRESSURE < 130 MM HG: ICD-10-PCS | Mod: CPTII,,, | Performed by: STUDENT IN AN ORGANIZED HEALTH CARE EDUCATION/TRAINING PROGRAM

## 2022-05-19 PROCEDURE — 1159F MED LIST DOCD IN RCRD: CPT | Mod: CPTII,,, | Performed by: STUDENT IN AN ORGANIZED HEALTH CARE EDUCATION/TRAINING PROGRAM

## 2022-05-19 PROCEDURE — 4010F PR ACE/ARB THEARPY RXD/TAKEN: ICD-10-PCS | Mod: CPTII,,, | Performed by: STUDENT IN AN ORGANIZED HEALTH CARE EDUCATION/TRAINING PROGRAM

## 2022-05-19 PROCEDURE — 99214 PR OFFICE/OUTPT VISIT, EST, LEVL IV, 30-39 MIN: ICD-10-PCS | Mod: S$PBB,,, | Performed by: STUDENT IN AN ORGANIZED HEALTH CARE EDUCATION/TRAINING PROGRAM

## 2022-05-19 PROCEDURE — 4010F ACE/ARB THERAPY RXD/TAKEN: CPT | Mod: CPTII,,, | Performed by: STUDENT IN AN ORGANIZED HEALTH CARE EDUCATION/TRAINING PROGRAM

## 2022-05-19 PROCEDURE — 3008F PR BODY MASS INDEX (BMI) DOCUMENTED: ICD-10-PCS | Mod: CPTII,,, | Performed by: STUDENT IN AN ORGANIZED HEALTH CARE EDUCATION/TRAINING PROGRAM

## 2022-05-19 PROCEDURE — 3078F PR MOST RECENT DIASTOLIC BLOOD PRESSURE < 80 MM HG: ICD-10-PCS | Mod: CPTII,,, | Performed by: STUDENT IN AN ORGANIZED HEALTH CARE EDUCATION/TRAINING PROGRAM

## 2022-05-19 PROCEDURE — 99214 OFFICE O/P EST MOD 30 MIN: CPT | Mod: S$PBB,,, | Performed by: STUDENT IN AN ORGANIZED HEALTH CARE EDUCATION/TRAINING PROGRAM

## 2022-05-19 NOTE — ASSESSMENT & PLAN NOTE
Hx of Left CFA endearterctomy and fem-fem bypass  Now with right leg discomfort, swelling and black discoloration around the medial malleolus  - On examination PT dopplerable; DP - not doplerable. Foot is warm.   - On aspirin, statin and pletal  - LINDA 0.9   - Peripheral angiography reveals a 90% ext. Iliac artery stenosis with severe calcification, also has 70% diffuse SFA disease with single vessel run-off below knee (peronal patent)  - plan for right iliac artery intervention with Shockwave. Will schedule procedure as soon has we get the shockwave catheter.   RTC in 4 weeks.

## 2022-05-20 DIAGNOSIS — I70.229 CRITICAL LOWER LIMB ISCHEMIA: Primary | ICD-10-CM

## 2022-05-23 RX ORDER — SODIUM CHLORIDE 9 MG/ML
INJECTION, SOLUTION INTRAVENOUS ONCE
Status: CANCELLED | OUTPATIENT
Start: 2022-05-23 | End: 2022-05-23

## 2022-05-23 RX ORDER — SODIUM CHLORIDE 0.9 % (FLUSH) 0.9 %
10 SYRINGE (ML) INJECTION
Status: CANCELLED | OUTPATIENT
Start: 2022-05-23

## 2022-05-26 ENCOUNTER — TELEPHONE (OUTPATIENT)
Dept: CARDIOLOGY | Facility: CLINIC | Age: 67
End: 2022-05-26
Payer: COMMERCIAL

## 2022-06-01 ENCOUNTER — TELEPHONE (OUTPATIENT)
Dept: CARDIOLOGY | Facility: CLINIC | Age: 67
End: 2022-06-01
Payer: COMMERCIAL

## 2022-06-06 ENCOUNTER — HOSPITAL ENCOUNTER (OUTPATIENT)
Facility: HOSPITAL | Age: 67
Discharge: HOME | End: 2022-06-06
Attending: INTERNAL MEDICINE | Admitting: INTERNAL MEDICINE
Payer: COMMERCIAL

## 2022-06-06 VITALS
RESPIRATION RATE: 16 BRPM | HEIGHT: 65 IN | SYSTOLIC BLOOD PRESSURE: 121 MMHG | DIASTOLIC BLOOD PRESSURE: 53 MMHG | TEMPERATURE: 98 F | OXYGEN SATURATION: 93 % | WEIGHT: 97.81 LBS | BODY MASS INDEX: 16.3 KG/M2 | HEART RATE: 114 BPM

## 2022-06-06 DIAGNOSIS — I70.229 CRITICAL LOWER LIMB ISCHEMIA: ICD-10-CM

## 2022-06-06 LAB — GLUCOSE SERPL-MCNC: 100 MG/DL (ref 70–105)

## 2022-06-06 PROCEDURE — 25000003 PHARM REV CODE 250: Performed by: INTERNAL MEDICINE

## 2022-06-06 PROCEDURE — 25500020 PHARM REV CODE 255: Performed by: INTERNAL MEDICINE

## 2022-06-06 PROCEDURE — 27201423 OPTIME MED/SURG SUP & DEVICES STERILE SUPPLY: Performed by: INTERNAL MEDICINE

## 2022-06-06 PROCEDURE — 99152 MOD SED SAME PHYS/QHP 5/>YRS: CPT | Performed by: INTERNAL MEDICINE

## 2022-06-06 PROCEDURE — C1725 CATH, TRANSLUMIN NON-LASER: HCPCS | Performed by: INTERNAL MEDICINE

## 2022-06-06 PROCEDURE — 82962 GLUCOSE BLOOD TEST: CPT

## 2022-06-06 PROCEDURE — 37221 PR REVASCULARIZE ILIAC ARTERY,ANGIOPLASTY/STENT, INITIAL VESSEL: CPT | Mod: RT,,, | Performed by: INTERNAL MEDICINE

## 2022-06-06 PROCEDURE — 27100168 OPTIME MED/SURG SUP & DEVICES NON-STERILE SUPPLY: Performed by: INTERNAL MEDICINE

## 2022-06-06 PROCEDURE — C1753 CATH, INTRAVAS ULTRASOUND: HCPCS | Performed by: INTERNAL MEDICINE

## 2022-06-06 PROCEDURE — 37221 PR REVASCULARIZE ILIAC ARTERY,ANGIOPLASTY/STENT, INITIAL VESSEL: ICD-10-PCS | Mod: RT,,, | Performed by: INTERNAL MEDICINE

## 2022-06-06 PROCEDURE — C1876 STENT, NON-COA/NON-COV W/DEL: HCPCS | Performed by: INTERNAL MEDICINE

## 2022-06-06 PROCEDURE — C1894 INTRO/SHEATH, NON-LASER: HCPCS | Performed by: INTERNAL MEDICINE

## 2022-06-06 PROCEDURE — 63600175 PHARM REV CODE 636 W HCPCS: Performed by: INTERNAL MEDICINE

## 2022-06-06 PROCEDURE — 37252 INTRVASC US NONCORONARY 1ST: CPT | Mod: ,,, | Performed by: INTERNAL MEDICINE

## 2022-06-06 PROCEDURE — 37252 PR IVUS, NON-CORONARY, 1ST VESSEL: ICD-10-PCS | Mod: ,,, | Performed by: INTERNAL MEDICINE

## 2022-06-06 PROCEDURE — C9765 REVASC INTRA LITHOTRIP-STENT: HCPCS | Mod: RT | Performed by: INTERNAL MEDICINE

## 2022-06-06 PROCEDURE — C1761 OPTIME CATH, TRANSLUMINAL INTRAVASC LITHO, CORONARY: HCPCS | Performed by: INTERNAL MEDICINE

## 2022-06-06 PROCEDURE — C1769 GUIDE WIRE: HCPCS | Performed by: INTERNAL MEDICINE

## 2022-06-06 PROCEDURE — 99153 MOD SED SAME PHYS/QHP EA: CPT | Performed by: INTERNAL MEDICINE

## 2022-06-06 PROCEDURE — 37252 INTRVASC US NONCORONARY 1ST: CPT | Performed by: INTERNAL MEDICINE

## 2022-06-06 PROCEDURE — 27000080 OPTIME MED/SURG SUP & DEVICES GENERAL CLASSIFICATION: Performed by: INTERNAL MEDICINE

## 2022-06-06 DEVICE — IMPLANTABLE DEVICE
Type: IMPLANTABLE DEVICE | Site: ARTERIAL | Status: FUNCTIONAL
Brand: CORDIS S.M.A.R.T. CONTROL VASCULAR STENT SYSTEM

## 2022-06-06 RX ORDER — ONDANSETRON 4 MG/1
8 TABLET, ORALLY DISINTEGRATING ORAL EVERY 8 HOURS PRN
Status: DISCONTINUED | OUTPATIENT
Start: 2022-06-06 | End: 2022-06-07 | Stop reason: HOSPADM

## 2022-06-06 RX ORDER — MIDAZOLAM HYDROCHLORIDE 1 MG/ML
INJECTION INTRAMUSCULAR; INTRAVENOUS
Status: DISCONTINUED | OUTPATIENT
Start: 2022-06-06 | End: 2022-06-06 | Stop reason: HOSPADM

## 2022-06-06 RX ORDER — ACETAMINOPHEN 325 MG/1
650 TABLET ORAL EVERY 4 HOURS PRN
Status: DISCONTINUED | OUTPATIENT
Start: 2022-06-06 | End: 2022-06-07 | Stop reason: HOSPADM

## 2022-06-06 RX ORDER — LIDOCAINE HYDROCHLORIDE 10 MG/ML
INJECTION, SOLUTION EPIDURAL; INFILTRATION; INTRACAUDAL; PERINEURAL
Status: DISCONTINUED | OUTPATIENT
Start: 2022-06-06 | End: 2022-06-06 | Stop reason: HOSPADM

## 2022-06-06 RX ORDER — HEPARIN SODIUM 1000 [USP'U]/ML
INJECTION, SOLUTION INTRAVENOUS; SUBCUTANEOUS
Status: DISCONTINUED | OUTPATIENT
Start: 2022-06-06 | End: 2022-06-06 | Stop reason: HOSPADM

## 2022-06-06 RX ORDER — SODIUM CHLORIDE 450 MG/100ML
INJECTION, SOLUTION INTRAVENOUS
Status: DISCONTINUED | OUTPATIENT
Start: 2022-06-06 | End: 2022-06-07 | Stop reason: HOSPADM

## 2022-06-06 RX ORDER — HYDRALAZINE HYDROCHLORIDE 20 MG/ML
INJECTION INTRAMUSCULAR; INTRAVENOUS
Status: DISCONTINUED | OUTPATIENT
Start: 2022-06-06 | End: 2022-06-06 | Stop reason: HOSPADM

## 2022-06-06 RX ORDER — HEPARIN SOD,PORCINE/0.9 % NACL 1000/500ML
INTRAVENOUS SOLUTION INTRAVENOUS
Status: DISCONTINUED | OUTPATIENT
Start: 2022-06-06 | End: 2022-06-06 | Stop reason: HOSPADM

## 2022-06-06 RX ORDER — ASPIRIN 81 MG/1
81 TABLET ORAL DAILY
Status: DISCONTINUED | OUTPATIENT
Start: 2022-06-07 | End: 2022-06-07 | Stop reason: HOSPADM

## 2022-06-06 RX ORDER — FENTANYL CITRATE 50 UG/ML
INJECTION, SOLUTION INTRAMUSCULAR; INTRAVENOUS
Status: DISCONTINUED | OUTPATIENT
Start: 2022-06-06 | End: 2022-06-06 | Stop reason: HOSPADM

## 2022-06-06 NOTE — Clinical Note
Report given to EFE Osman.  Pt awake and alert talking with family. 121/59, 97% on room air, 77Hr, 16RR. Site without s/s hematoma/ bleeding. PT pulse doppler prior to leaving cath lab

## 2022-06-06 NOTE — Clinical Note
The right groin was prepped. The site was clipped. The patient was draped. The patient was positioned supine. The patient was secured using safety straps and with ulnar pads.

## 2022-06-06 NOTE — Clinical Note
The catheter was inserted into the  middle right external iliac artery. Shockwave 6.5mm x135mm 7fr   And inflated to 4 zuleyka twice.

## 2022-06-06 NOTE — Clinical Note
An angiography was performed of the ostial, proximal, mid and distal right common iliac arteries via hand injection with

## 2022-06-06 NOTE — Clinical Note
An angiography was performed of the mid right external iliac arterypost intervention  via hand injection with

## 2022-06-07 NOTE — NURSING
IV removed intact. Pt dressed self and transferred to wheelchair, Discharge instructions given to pt and daughter. Discharge folder given to pt. No acute distress noted at this time. Pt to discharge home via private vehicle. Taken to vehicle via wheelchair by CNA.

## 2022-06-07 NOTE — DISCHARGE INSTRUCTIONS
Keep all follow appts.  Notify MD if nodule bigger than dime forms at incision site.  Keep current bandage on tonight and replace with bandaid on tomorrow.  Apply pressure to insicion when coughing, turning, sneezing, applying any pressure to right leg.

## 2022-06-07 NOTE — NURSING
Pt right groin cath site is dry and intact. Pt's daughters are at bedside to take pt home. Pt is able transfer to wheelchair without assist.

## 2022-06-08 ENCOUNTER — TELEPHONE (OUTPATIENT)
Dept: CARDIOLOGY | Facility: CLINIC | Age: 67
End: 2022-06-08
Payer: COMMERCIAL

## 2022-06-08 NOTE — TELEPHONE ENCOUNTER
Called spoke to Ms Macias. I tried calling Mr Mustafa, could not reach him. His phone is not working properly.  She said she was going to his home on her lunch break, she would have him call me back.  I told her per Dr. Del Rosario he was to stop taking the Pletal (cilostazol) -continue taking the plavix and start taking aspirin 81 mg. She said ok she would tell him and tell him to call clinic.

## 2022-06-09 ENCOUNTER — TELEPHONE (OUTPATIENT)
Dept: CARDIOLOGY | Facility: CLINIC | Age: 67
End: 2022-06-09
Payer: COMMERCIAL

## 2022-06-09 DIAGNOSIS — I70.229 CRITICAL LOWER LIMB ISCHEMIA: Primary | ICD-10-CM

## 2022-11-09 DIAGNOSIS — Z71.89 COMPLEX CARE COORDINATION: ICD-10-CM

## 2022-11-25 ENCOUNTER — LAB REQUISITION (OUTPATIENT)
Dept: LAB | Facility: HOSPITAL | Age: 67
End: 2022-11-25
Payer: COMMERCIAL

## 2022-11-25 DIAGNOSIS — Z79.02 LONG TERM (CURRENT) USE OF ANTITHROMBOTICS/ANTIPLATELETS: ICD-10-CM

## 2022-11-25 DIAGNOSIS — I10 ESSENTIAL (PRIMARY) HYPERTENSION: ICD-10-CM

## 2022-11-25 LAB
ALBUMIN SERPL BCP-MCNC: 2.8 G/DL (ref 3.5–5)
ALBUMIN/GLOB SERPL: 1 {RATIO}
ALP SERPL-CCNC: 90 U/L (ref 45–115)
ALT SERPL W P-5'-P-CCNC: 22 U/L (ref 16–61)
ANION GAP SERPL CALCULATED.3IONS-SCNC: 5 MMOL/L (ref 7–16)
AST SERPL W P-5'-P-CCNC: 25 U/L (ref 15–37)
BASOPHILS # BLD AUTO: 0.02 K/UL (ref 0–0.2)
BASOPHILS NFR BLD AUTO: 0.5 % (ref 0–1)
BILIRUB SERPL-MCNC: 0.3 MG/DL (ref ?–1.2)
BUN SERPL-MCNC: 5 MG/DL (ref 7–18)
BUN/CREAT SERPL: 9 (ref 6–20)
CALCIUM SERPL-MCNC: 8.6 MG/DL (ref 8.5–10.1)
CHLORIDE SERPL-SCNC: 109 MMOL/L (ref 98–107)
CHOLEST SERPL-MCNC: 203 MG/DL (ref 0–200)
CHOLEST/HDLC SERPL: 1.7 {RATIO}
CO2 SERPL-SCNC: 36 MMOL/L (ref 21–32)
CREAT SERPL-MCNC: 0.54 MG/DL (ref 0.7–1.3)
DIFFERENTIAL METHOD BLD: ABNORMAL
EGFR (NO RACE VARIABLE) (RUSH/TITUS): 108 ML/MIN/1.73M²
EOSINOPHIL # BLD AUTO: 0.04 K/UL (ref 0–0.5)
EOSINOPHIL NFR BLD AUTO: 1 % (ref 1–4)
ERYTHROCYTE [DISTWIDTH] IN BLOOD BY AUTOMATED COUNT: 13.4 % (ref 11.5–14.5)
GLOBULIN SER-MCNC: 2.7 G/DL (ref 2–4)
GLUCOSE SERPL-MCNC: 77 MG/DL (ref 74–106)
HCT VFR BLD AUTO: 28.6 % (ref 40–54)
HDLC SERPL-MCNC: 123 MG/DL (ref 40–60)
HGB BLD-MCNC: 9.5 G/DL (ref 13.5–18)
LDLC SERPL CALC-MCNC: 64 MG/DL
LDLC/HDLC SERPL: 0.5 {RATIO}
LYMPHOCYTES # BLD AUTO: 2.03 K/UL (ref 1–4.8)
LYMPHOCYTES NFR BLD AUTO: 49.8 % (ref 27–41)
MCH RBC QN AUTO: 34.4 PG (ref 27–31)
MCHC RBC AUTO-ENTMCNC: 33.2 G/DL (ref 32–36)
MCV RBC AUTO: 103.6 FL (ref 80–96)
MONOCYTES # BLD AUTO: 0.38 K/UL (ref 0–0.8)
MONOCYTES NFR BLD AUTO: 9.3 % (ref 2–6)
MPC BLD CALC-MCNC: 10.3 FL (ref 9.4–12.4)
NEUTROPHILS # BLD AUTO: 1.61 K/UL (ref 1.8–7.7)
NEUTROPHILS NFR BLD AUTO: 39.4 % (ref 53–65)
NONHDLC SERPL-MCNC: 80 MG/DL
PLATELET # BLD AUTO: 124 K/UL (ref 150–400)
POTASSIUM SERPL-SCNC: 2.8 MMOL/L (ref 3.5–5.1)
PROT SERPL-MCNC: 5.5 G/DL (ref 6.4–8.2)
RBC # BLD AUTO: 2.76 M/UL (ref 4.6–6.2)
SODIUM SERPL-SCNC: 147 MMOL/L (ref 136–145)
TRIGL SERPL-MCNC: 78 MG/DL (ref 35–150)
TSH SERPL DL<=0.005 MIU/L-ACNC: 0.78 UIU/ML (ref 0.36–3.74)
VLDLC SERPL-MCNC: 16 MG/DL
WBC # BLD AUTO: 4.08 K/UL (ref 4.5–11)

## 2022-11-25 PROCEDURE — 80061 LIPID PANEL: CPT

## 2022-11-25 PROCEDURE — 85025 COMPLETE CBC W/AUTO DIFF WBC: CPT

## 2022-11-25 PROCEDURE — 83540 ASSAY OF IRON: CPT

## 2022-11-25 PROCEDURE — 84443 ASSAY THYROID STIM HORMONE: CPT

## 2022-11-25 PROCEDURE — 83036 HEMOGLOBIN GLYCOSYLATED A1C: CPT

## 2022-11-25 PROCEDURE — 80053 COMPREHEN METABOLIC PANEL: CPT

## 2022-11-26 LAB
EST. AVERAGE GLUCOSE BLD GHB EST-MCNC: 94 MG/DL
HBA1C MFR BLD HPLC: 5.4 % (ref 4.5–6.6)
IRON SATN MFR SERPL: 30 % (ref 14–50)
IRON SERPL-MCNC: 58 ΜG/DL (ref 65–175)
TIBC SERPL-MCNC: 193 ΜG/DL (ref 250–450)

## 2023-02-16 ENCOUNTER — LAB REQUISITION (OUTPATIENT)
Dept: LAB | Facility: HOSPITAL | Age: 68
End: 2023-02-16
Payer: COMMERCIAL

## 2023-02-16 DIAGNOSIS — Z79.02 LONG TERM (CURRENT) USE OF ANTITHROMBOTICS/ANTIPLATELETS: ICD-10-CM

## 2023-02-16 DIAGNOSIS — Z95.0 PRESENCE OF CARDIAC PACEMAKER: ICD-10-CM

## 2023-02-16 LAB — MAGNESIUM SERPL-MCNC: 1.7 MG/DL (ref 1.7–2.3)

## 2023-02-16 PROCEDURE — 83735 ASSAY OF MAGNESIUM: CPT

## 2023-11-23 ENCOUNTER — HOSPITAL ENCOUNTER (EMERGENCY)
Facility: HOSPITAL | Age: 68
Discharge: HOME | End: 2023-11-23
Payer: MEDICARE

## 2023-11-23 VITALS
HEART RATE: 90 BPM | DIASTOLIC BLOOD PRESSURE: 52 MMHG | SYSTOLIC BLOOD PRESSURE: 110 MMHG | TEMPERATURE: 99 F | OXYGEN SATURATION: 95 % | WEIGHT: 109 LBS | BODY MASS INDEX: 18.14 KG/M2 | RESPIRATION RATE: 21 BRPM

## 2023-11-23 DIAGNOSIS — M25.569 KNEE PAIN: ICD-10-CM

## 2023-11-23 DIAGNOSIS — M79.605 LEFT LEG PAIN: Primary | ICD-10-CM

## 2023-11-23 LAB
ANION GAP SERPL CALCULATED.3IONS-SCNC: 12 MMOL/L (ref 7–16)
BASOPHILS # BLD AUTO: 0.02 K/UL (ref 0–0.2)
BASOPHILS NFR BLD AUTO: 0.3 % (ref 0–1)
BUN SERPL-MCNC: 17 MG/DL (ref 7–18)
BUN/CREAT SERPL: 20 (ref 6–20)
CALCIUM SERPL-MCNC: 8.9 MG/DL (ref 8.5–10.1)
CHLORIDE SERPL-SCNC: 104 MMOL/L (ref 98–107)
CO2 SERPL-SCNC: 29 MMOL/L (ref 21–32)
CREAT SERPL-MCNC: 0.85 MG/DL (ref 0.7–1.3)
DIFFERENTIAL METHOD BLD: ABNORMAL
EGFR (NO RACE VARIABLE) (RUSH/TITUS): 93 ML/MIN/1.73M2
EOSINOPHIL # BLD AUTO: 0.03 K/UL (ref 0–0.5)
EOSINOPHIL NFR BLD AUTO: 0.4 % (ref 1–4)
ERYTHROCYTE [DISTWIDTH] IN BLOOD BY AUTOMATED COUNT: 14.9 % (ref 11.5–14.5)
GLUCOSE SERPL-MCNC: 122 MG/DL (ref 74–106)
HCT VFR BLD AUTO: 32.3 % (ref 40–54)
HGB BLD-MCNC: 10.4 G/DL (ref 13.5–18)
LYMPHOCYTES # BLD AUTO: 0.58 K/UL (ref 1–4.8)
LYMPHOCYTES NFR BLD AUTO: 7.9 % (ref 27–41)
MCH RBC QN AUTO: 33.1 PG (ref 27–31)
MCHC RBC AUTO-ENTMCNC: 32.2 G/DL (ref 32–36)
MCV RBC AUTO: 102.9 FL (ref 80–96)
MONOCYTES # BLD AUTO: 0.58 K/UL (ref 0–0.8)
MONOCYTES NFR BLD AUTO: 7.9 % (ref 2–6)
MPC BLD CALC-MCNC: 9.1 FL (ref 9.4–12.4)
NEUTROPHILS # BLD AUTO: 6.09 K/UL (ref 1.8–7.7)
NEUTROPHILS NFR BLD AUTO: 83.5 % (ref 53–65)
PLATELET # BLD AUTO: 124 K/UL (ref 150–400)
POTASSIUM SERPL-SCNC: 3.2 MMOL/L (ref 3.5–5.1)
RBC # BLD AUTO: 3.14 M/UL (ref 4.6–6.2)
SODIUM SERPL-SCNC: 142 MMOL/L (ref 136–145)
WBC # BLD AUTO: 7.3 K/UL (ref 4.5–11)

## 2023-11-23 PROCEDURE — 99284 EMERGENCY DEPT VISIT MOD MDM: CPT | Mod: GF | Performed by: REGISTERED NURSE

## 2023-11-23 PROCEDURE — 85025 COMPLETE CBC W/AUTO DIFF WBC: CPT | Performed by: REGISTERED NURSE

## 2023-11-23 PROCEDURE — 96372 THER/PROPH/DIAG INJ SC/IM: CPT | Performed by: REGISTERED NURSE

## 2023-11-23 PROCEDURE — 63600175 PHARM REV CODE 636 W HCPCS: Performed by: REGISTERED NURSE

## 2023-11-23 PROCEDURE — 99284 EMERGENCY DEPT VISIT MOD MDM: CPT

## 2023-11-23 PROCEDURE — 80048 BASIC METABOLIC PNL TOTAL CA: CPT | Performed by: REGISTERED NURSE

## 2023-11-23 RX ORDER — ONDANSETRON 2 MG/ML
4 INJECTION INTRAMUSCULAR; INTRAVENOUS
Status: COMPLETED | OUTPATIENT
Start: 2023-11-23 | End: 2023-11-23

## 2023-11-23 RX ORDER — KETOROLAC TROMETHAMINE 30 MG/ML
30 INJECTION, SOLUTION INTRAMUSCULAR; INTRAVENOUS
Status: COMPLETED | OUTPATIENT
Start: 2023-11-23 | End: 2023-11-23

## 2023-11-23 RX ORDER — MORPHINE SULFATE 4 MG/ML
4 INJECTION, SOLUTION INTRAMUSCULAR; INTRAVENOUS
Status: COMPLETED | OUTPATIENT
Start: 2023-11-23 | End: 2023-11-23

## 2023-11-23 RX ADMIN — MORPHINE SULFATE 4 MG: 4 INJECTION, SOLUTION INTRAMUSCULAR; INTRAVENOUS at 10:11

## 2023-11-23 RX ADMIN — ONDANSETRON 4 MG: 2 INJECTION INTRAMUSCULAR; INTRAVENOUS at 10:11

## 2023-11-23 RX ADMIN — KETOROLAC TROMETHAMINE 30 MG: 30 INJECTION INTRAMUSCULAR; INTRAVENOUS at 10:11

## 2023-11-24 NOTE — ED PROVIDER NOTES
Encounter Date: 2023       History     Chief Complaint   Patient presents with    Leg Pain     69 y-old AAM received to ED with complaint of pain to his stump site of his LLE. States that pain has been ongoing for the past 2 days. No recent injury. Stump is warm to touch, without any erythema or edema. Patient with a history of severe PAD with  s/p left BKA for gangrene of left foot in 2020.       Review of patient's allergies indicates:  No Known Allergies  Past Medical History:   Diagnosis Date    Anticoagulant long-term use     Arthritis     Atrial fibrillation     Below knee amputation     COPD (chronic obstructive pulmonary disease)     GERD (gastroesophageal reflux disease)     High cholesterol     Hypertension     Pacemaker      Past Surgical History:   Procedure Laterality Date    ATHERECTOMY N/A 2022    Procedure: Atherectomy;  Surgeon: Shira Del Rosario MD;  Location: [facility] CATH LAB;  Service: Cardiology;  Laterality: N/A;  RIght    BACK SURGERY      BKA Left     CARDIAC SURGERY      PACEMAKER, IVCF    PERIPHERAL ANGIOGRAPHY N/A 2022    Procedure: Peripheral angiography;  Surgeon: Shira Del Rosario MD;  Location: [facility] CATH LAB;  Service: Cardiology;  Laterality: N/A;     History reviewed. No pertinent family history.  Social History     Tobacco Use    Smoking status: Former     Current packs/day: 0.00     Average packs/day: 0.3 packs/day for 62.0 years (15.5 ttl pk-yrs)     Types: Cigars, Cigarettes     Start date:      Quit date: 2019     Years since quittin.8    Smokeless tobacco: Current     Types: Snuff    Tobacco comments:     1 can every 3 days   Substance Use Topics    Alcohol use: Yes     Comment: 2 pints a week - gin    Drug use: Yes     Types: Marijuana     Review of Systems   Constitutional: Negative.    HENT: Negative.     Eyes: Negative.    Respiratory: Negative.     Cardiovascular: Negative.    Gastrointestinal: Negative.    Endocrine: Negative.     Genitourinary: Negative.    Musculoskeletal:  Positive for arthralgias, gait problem and myalgias.   Skin: Negative.    Allergic/Immunologic: Negative.    Hematological: Negative.    Psychiatric/Behavioral: Negative.         Physical Exam     Initial Vitals [11/23/23 2222]   BP Pulse Resp Temp SpO2   (!) 110/52 90 18 99.1 °F (37.3 °C) 95 %      MAP       --         Physical Exam    Nursing note and vitals reviewed.  Constitutional: He appears well-developed and well-nourished.   HENT:   Head: Normocephalic and atraumatic.   Right Ear: External ear normal.   Left Ear: External ear normal.   Nose: Nose normal.   Mouth/Throat: Oropharynx is clear and moist.   Eyes: Conjunctivae are normal.   Neck: Neck supple.   Normal range of motion.  Cardiovascular:  Normal rate, regular rhythm, normal heart sounds and intact distal pulses.           Pulmonary/Chest: Breath sounds normal.   Abdominal: Abdomen is soft. Bowel sounds are normal.   Musculoskeletal:         General: Normal range of motion.      Cervical back: Normal range of motion and neck supple.     Neurological: He is alert and oriented to person, place, and time. He has normal strength. GCS score is 15. GCS eye subscore is 4. GCS verbal subscore is 5. GCS motor subscore is 6.   Skin: Skin is warm and dry. Capillary refill takes less than 2 seconds.   Psychiatric: He has a normal mood and affect. His behavior is normal. Judgment and thought content normal.         Medical Screening Exam   See Full Note    ED Course   Procedures  Labs Reviewed   BASIC METABOLIC PANEL - Abnormal; Notable for the following components:       Result Value    Potassium 3.2 (*)     Glucose 122 (*)     All other components within normal limits   CBC WITH DIFFERENTIAL - Abnormal; Notable for the following components:    RBC 3.14 (*)     Hemoglobin 10.4 (*)     Hematocrit 32.3 (*)     .9 (*)     MCH 33.1 (*)     RDW 14.9 (*)     Platelet Count 124 (*)     MPV 9.1 (*)     Neutrophils %  83.5 (*)     Lymphocytes % 7.9 (*)     Lymphocytes, Absolute 0.58 (*)     Monocytes % 7.9 (*)     Eosinophils % 0.4 (*)     All other components within normal limits   CBC W/ AUTO DIFFERENTIAL    Narrative:     The following orders were created for panel order CBC auto differential.  Procedure                               Abnormality         Status                     ---------                               -----------         ------                     CBC with Differential[[id number]]       Abnormal            Final result                 Please view results for these tests on the individual orders.          Imaging Results              X-Ray Knee 1 or 2 View Left (In process)  Result time 11/23/23 22:36:55   Procedure changed from X-Ray Knee 3 View Left                    Medications   ketorolac injection 30 mg (30 mg Intramuscular Given 11/23/23 2230)   morphine injection 4 mg (4 mg Intramuscular Given 11/23/23 2230)   ondansetron injection 4 mg (4 mg Intramuscular Given 11/23/23 2230)     Medical Decision Making  69 y-old AAM received to ED with complaint of pain to his stump site of his LLE. States that pain has been ongoing for the past 2 days. No recent injury. Stump is warm to touch, without any erythema or edema. 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       Amount and/or Complexity of Data Reviewed  Labs:      Details: Labs are at patients baseline.   Radiology: ordered.     Details: No obvious streaking or gas noted on x-ray.     Risk  Prescription drug management.  Risk Details: Discussed with patient at length his work up. Recommend that he f/u with his PCP or present to the hospital where his surgery was performed if symptoms fail to improve or worsen.                                    Clinical Impression:   Final diagnoses:  [M25.569] Knee pain               Tha Whitaker, NP-C  11/23/23 [id number]

## (undated) DEVICE — SHEATH INTRODUCER 6FR 10CM X 0.038 PINNACLE

## (undated) DEVICE — TAPE GLOW'N TELL 55CM STL BX/20

## (undated) DEVICE — TUBING CAPNOLINE PLUS SMART 02 CONNECTOR(ORDER 65110)

## (undated) DEVICE — CATH IMPULSE 6FR MPA-1

## (undated) DEVICE — SET IV EXTENSION 42IN W/2 PORT CLEARLINK

## (undated) DEVICE — CATH QUICK-CROSS .035X15

## (undated) DEVICE — KIT IV START 849

## (undated) DEVICE — BAG-A-JET FLUID DISPENSER SYSTEM

## (undated) DEVICE — SET IV PRIMARY ALARIS (PRIMARY)

## (undated) DEVICE — DRESSING IV TEGADERM 10X12CM

## (undated) DEVICE — Device

## (undated) DEVICE — DEVICE INFLATION BLUE DIAMOND IN7112

## (undated) DEVICE — GLOVE SURGICAL PROTEXIS PI SIZE 6

## (undated) DEVICE — KIT INTRAOPERATIVE ULTRASOUND PROBE COVER 6INX96IN

## (undated) DEVICE — CDS ANGIOGRAPHY PACK

## (undated) DEVICE — STOPCOCK 3-WAY ROTATING

## (undated) DEVICE — TOWEL OR STERILE BLUE 4/PK 20PK/CS

## (undated) DEVICE — GUIDEWIRE FLOPPY WHOLEY 260 CM EXCHANGE

## (undated) DEVICE — DEVICE MYNX GRIP VASCULAR CLOSURE(MINIMUM OF 10 EACH)

## (undated) DEVICE — KIT MICROINTRODUCER 4FR MINI STICK II

## (undated) DEVICE — GLOVE SURGICAL PROTEXIS PI SIZE 7

## (undated) DEVICE — POSITIONER ULNAR NERVE

## (undated) DEVICE — CATH IMPULSE 6FR PIGTAIL

## (undated) DEVICE — SYSTEM BACK STOP CLOSED WASTE

## (undated) DEVICE — APPLICATOR CHLORAPREP LITE ORANGE 10.5ML STERILE

## (undated) DEVICE — ISOVUE 370 100ML

## (undated) DEVICE — DEVICE RADIAL TR BAND REG

## (undated) DEVICE — SENSOR PULSE OX ADULT

## (undated) DEVICE — CATH IV JELCO 22GX1 IN

## (undated) DEVICE — GUIDEWIRE J .035 X 260CM

## (undated) DEVICE — CATHETER IMAGING IVUS EAGLE EYE PLATINUM

## (undated) DEVICE — SHEATH INTRODUCER 7FR 10CM 0.038 PINNACLE

## (undated) DEVICE — GLIDESHEATH SLENDER INTRODUCER 6FR

## (undated) DEVICE — DRAPE BRACHIAL ANGIOGRAPHY TIBURON